# Patient Record
Sex: FEMALE | Race: BLACK OR AFRICAN AMERICAN | URBAN - METROPOLITAN AREA
[De-identification: names, ages, dates, MRNs, and addresses within clinical notes are randomized per-mention and may not be internally consistent; named-entity substitution may affect disease eponyms.]

---

## 2017-04-17 LAB
ABO + RH BLD: NORMAL
ABO + RH BLD: NORMAL
BLD GP AB SCN SERPL QL: NEGATIVE
C TRACH DNA SPEC QL PROBE+SIG AMP: NEGATIVE
CULTURE MICRO: NORMAL
HBV SURFACE AG SERPL QL IA: NORMAL
HCT VFR BLD AUTO: 37 %
HEMOGLOBIN: 13.2 G/DL (ref 11.7–15.7)
HIV 1+2 AB+HIV1 P24 AG SERPL QL IA: NORMAL
N GONORRHOEA DNA SPEC QL PROBE+SIG AMP: NEGATIVE
PLATELET # BLD AUTO: 206 10^9/L
T PALLIDUM IGG SER QL: NORMAL

## 2017-04-26 PROBLEM — M54.50 CHRONIC LOW BACK PAIN: Status: ACTIVE | Noted: 2017-04-26

## 2017-04-26 PROBLEM — L91.0 KELOID SCAR: Status: ACTIVE | Noted: 2017-04-26

## 2017-04-26 PROBLEM — G89.29 CHRONIC LOW BACK PAIN: Status: ACTIVE | Noted: 2017-04-26

## 2017-04-26 PROBLEM — Z34.90 SUPERVISION OF NORMAL PREGNANCY: Status: ACTIVE | Noted: 2017-04-26

## 2017-05-11 ENCOUNTER — PRENATAL OFFICE VISIT (OUTPATIENT)
Dept: OBGYN | Facility: CLINIC | Age: 27
End: 2017-05-11
Payer: COMMERCIAL

## 2017-05-11 VITALS
DIASTOLIC BLOOD PRESSURE: 64 MMHG | SYSTOLIC BLOOD PRESSURE: 97 MMHG | WEIGHT: 112 LBS | HEART RATE: 129 BPM | OXYGEN SATURATION: 96 %

## 2017-05-11 DIAGNOSIS — O34.219 PREVIOUS CESAREAN DELIVERY, ANTEPARTUM CONDITION OR COMPLICATION: Primary | ICD-10-CM

## 2017-05-11 PROBLEM — O26.891 RH NEGATIVE STATUS DURING PREGNANCY IN FIRST TRIMESTER, ANTEPARTUM: Status: ACTIVE | Noted: 2017-05-11

## 2017-05-11 PROBLEM — Z34.90 SUPERVISION OF NORMAL PREGNANCY: Status: RESOLVED | Noted: 2017-04-26 | Resolved: 2017-05-11

## 2017-05-11 PROBLEM — Z98.891 PREVIOUS CESAREAN SECTION: Status: ACTIVE | Noted: 2017-05-11

## 2017-05-11 PROBLEM — Z67.91 RH NEGATIVE STATUS DURING PREGNANCY IN FIRST TRIMESTER, ANTEPARTUM: Status: ACTIVE | Noted: 2017-05-11

## 2017-05-11 PROCEDURE — G0145 SCR C/V CYTO,THINLAYER,RESCR: HCPCS | Performed by: OBSTETRICS & GYNECOLOGY

## 2017-05-11 PROCEDURE — 87591 N.GONORRHOEAE DNA AMP PROB: CPT | Performed by: OBSTETRICS & GYNECOLOGY

## 2017-05-11 PROCEDURE — 99207 ZZC FIRST OB VISIT: CPT | Performed by: OBSTETRICS & GYNECOLOGY

## 2017-05-11 PROCEDURE — T1013 SIGN LANG/ORAL INTERPRETER: HCPCS | Mod: U3 | Performed by: OBSTETRICS & GYNECOLOGY

## 2017-05-11 PROCEDURE — 87491 CHLMYD TRACH DNA AMP PROBE: CPT | Performed by: OBSTETRICS & GYNECOLOGY

## 2017-05-11 NOTE — MR AVS SNAPSHOT
"              After Visit Summary   2017    Jean-Paul Jimenez    MRN: [id number]           Patient Information     Date Of Birth          1990        Visit Information        Provider Department      2017 9:45 AM Nevin Ospina; Colette Clement MD [facility]        Today's Diagnoses     Previous  delivery, antepartum condition or complication    -  1       Follow-ups after your visit        Your next 10 appointments already scheduled     2017 10:30 AM CDT   ESTABLISHED PRENATAL with Colette Clement MD   [facility] ([facility])    [address] [place] [id number] [phone]              Who to contact     If you have questions or need follow up information about today's clinic visit or your schedule please contact [facility] directly at [phone].  Normal or non-critical lab and imaging results will be communicated to you by MyChart, letter or phone within 4 business days after the clinic has received the results. If you do not hear from us within 7 days, please contact the clinic through MyChart or phone. If you have a critical or abnormal lab result, we will notify you by phone as soon as possible.  Submit refill requests through [employer] or call your pharmacy and they will forward the refill request to us. Please allow 3 business days for your refill to be completed.          Additional Information About Your Visit        MyChart Information     [employer] lets you send messages to your doctor, view your test results, renew your prescriptions, schedule appointments and more. To sign up, go to [email or web address] . Click on \"Log in\" on the left side of the screen, which will take you to the Welcome page. Then click on \"Sign up Now\" on the right side of the page.     You will be asked to enter the access code listed below, as well as some personal information. Please follow the directions to create " your username and password.     Your access code is: [email or web address]  Expires: 2017 11:54 AM     Your access code will  in 90 days. If you need help or a new code, please call your [facility] or [phone].        Care EveryWhere ID     This is your Care EveryWhere ID. This could be used by other organizations to access your [place] medical records  [id number]        Your Vitals Were     Pulse Last Period Pulse Oximetry Breastfeeding?          129 2017 96% No         Blood Pressure from Last 3 Encounters:   17 97/64    Weight from Last 3 Encounters:   17 112 lb (50.8 kg)              We Performed the Following     ABO and Rh     Anti Treponema     CBC with platelets     Chlamydia trachomatis PCR     CHLAMYDIA TRACHOMATIS PCR     Hepatitis B surface antigen     HIV Antigen Antibody Combo     NEISSERIA GONORRHOEA PCR     Neisseria gonorrhoeae PCR     OB hemoglobin     Pap imaged thin layer screen only - recommended age 21 - 24 years     Urine Culture Aerobic Bacterial        Primary Care Provider    None Specified       No primary provider on file.        Thank you!     Thank you for [place] [facility]  [place] your [place]. Our goal is always to provide you with excellent care. Hearing back from our patients is one way we can continue to improve our services. Please take a few minutes to complete the written survey that you may receive in the mail after your visit with us. Thank you!             Your Updated Medication List - Protect others around you: Learn how to safely use, store and throw away your medicines at www.disposemymeds.org.      Notice  As of 2017 11:54 AM    You have not been prescribed any medications.

## 2017-05-11 NOTE — NURSING NOTE
Chief Complaint   Patient presents with     Prenatal Care       Initial BP 97/64  Pulse 129  Wt 112 lb (50.8 kg)  LMP 2017  SpO2 96%  Breastfeeding? No There is no height or weight on file to calculate BMI.  BP completed using cuff size: regular        The following HM Due: pap smear      The following patient reported/Care Every where data was sent to:  P ABSTRACT QUALITY INITIATIVES [[id number]]  none    patient has appointment for today

## 2017-05-11 NOTE — LETTER
[address]  [facility] [address]  [phone]      May 15, 2017      Jean-Paul Jimenez  [address] [place] [address]              Dear Jean-Paul,    Your recent gonorrhea and chlamydia cultures were negative.  If you have any questions please call the nurse line at [phone].      Sincerely,      Colette Clement MD/[facility]dy

## 2017-05-11 NOTE — LETTER
May 17, 2017      Jean-Paul Jimenez  [address]  [place] [address]    Dear ,      I am happy to inform you that your recent cervical cancer screening test (PAP smear) was normal.      Preventative screenings such as this help to ensure your health for years to come. You should repeat a pap smear in 3 years, unless otherwise directed.      You will still need to return to the clinic every year for your annual exam and other preventive tests.     Please contact the clinic at [phone] if you have further questions.       Sincerely,      Colette Clement MD/[facility]

## 2017-05-11 NOTE — PROGRESS NOTES
SUBJECTIVE: Jean-Paul Jimenez is an 26 year old female  here for initial OB visit.   LMP was Patient's last menstrual period was 2017.  DEEDEE is Estimated Date of Delivery: 2017, and she is approximately 11w5d  weeks today.     She reports some nausea, otherwise no current significant problems.  Discussed medication, she will call if she needs this.  She had a first OB visit at [facility], labs show Rh negative, wishes to transfer care here and deliver at [place].  Had primary C/S for pre-eclampsia, her  is here and states that they did not want a C/S but were told that they must proceed, so they did.  Menses were regular, LMP normal.      * Records were reviewed after patient left.  Primary LTCS, one layer uterine closure, done at 38w4d for severe pre-eclampsia, HELLP, remote from delivery.  Labs showed plts 54,000/uric acid 8.1//.  Per chart notes, patient was reluctant to proceed with C/S as the procedure was so dangerous in [place] country [place]  Procedure was uncomplicated, post op hgb 5.6, had blood transfusion.  Will discuss further at next visit.       EXAM: Blood pressure 97/64, pulse 129, weight 112 lb (50.8 kg), last menstrual period 2017, SpO2 96 %, not currently breastfeeding.  General appearance revealed a healthy, well-nourished woman in no distress.  HEENT exam is unremarkable.  Thyroid is not enlarged.  Normal respiratory effort was noted.  Heart rate is regular without murmur.    Breasts are without dominant mass. Abdomen is soft, nontender without hernia or hepatosplenomegaly. There is no abnormal inguinal nor axillary adenopathy. Skin was normal.  Extremeties were without edema.  Neuro/psych exam revealed appropriate mood and affect.      PELVIC EXAM:  External genitalia appeared normal.  Urethral meatus, urethra, and bladder appeared normal.  Cervix and vagina appeared normal.  Pap was sent.  GC/CT sent.    Uterus was 11-12  week size, non-tender.  FHT's heard.    Adnexae:  no masses nor tenderness.  Rectal deferred.     ASSESSMENT:  Normal first OB exam.  Prior C/S.     PLAN:  1.  Follow up in 4 weeks  2.  Fetal survey ultrasound, quad screen offered.  3.  First trimester screening discussed and offered.  They declined genetic testing/screening.  Would like survey ultrasound.   4.  Will discuss prior records at next visit, discuss mode of delivery.

## 2017-05-12 LAB
C TRACH DNA SPEC QL NAA+PROBE: NORMAL
N GONORRHOEA DNA SPEC QL NAA+PROBE: NORMAL
SPECIMEN SOURCE: NORMAL
SPECIMEN SOURCE: NORMAL

## 2017-05-15 LAB
COPATH REPORT: NORMAL
PAP: NORMAL

## 2017-07-20 ENCOUNTER — PRENATAL OFFICE VISIT (OUTPATIENT)
Dept: OBGYN | Facility: CLINIC | Age: 27
End: 2017-07-20
Payer: COMMERCIAL

## 2017-07-20 ENCOUNTER — TELEPHONE (OUTPATIENT)
Dept: OBGYN | Facility: CLINIC | Age: 27
End: 2017-07-20

## 2017-07-20 VITALS
DIASTOLIC BLOOD PRESSURE: 54 MMHG | OXYGEN SATURATION: 100 % | WEIGHT: 119 LBS | SYSTOLIC BLOOD PRESSURE: 91 MMHG | HEART RATE: 59 BPM

## 2017-07-20 DIAGNOSIS — Z34.80 SUPERVISION OF OTHER NORMAL PREGNANCY, ANTEPARTUM: Primary | ICD-10-CM

## 2017-07-20 PROCEDURE — 99207 ZZC PRENATAL VISIT: CPT | Performed by: OBSTETRICS & GYNECOLOGY

## 2017-07-20 NOTE — TELEPHONE ENCOUNTER
Vishal Sharma  10/29/1963.  's phone number is [phone].  Note is done and is at the  for pick-up.  Pt's , Florinda Manning, is aware.  Patti Petersen RN

## 2017-07-20 NOTE — PROGRESS NOTES
Limited communication today as  was not scheduled.  Wants survey ultrasound, order written.  S>D.  Discussed one hour glucose.  She will RTC in 3 weeks to do one hour glucose, to review prior C/S records, with  present. They request letter documenting need for patient's mother to travel here from [place] to assist patient after delivery.   AM

## 2017-07-20 NOTE — MR AVS SNAPSHOT
"              After Visit Summary   7/20/2017    Jean-Paul Jimenez    MRN: [id number]           Patient Information     Date Of Birth          1990        Visit Information        Provider Department      7/20/2017 9:00 AM Open, Assignments; Colette Clement MD [facility]        Today's Diagnoses     Supervision of other normal pregnancy, antepartum    -  1       Follow-ups after your visit        Your next 10 appointments already scheduled     Aug 03, 2017 11:30 AM CDT   ESTABLISHED PRENATAL with Colette Clement MD   [facility] ([facility]    [address] [place] [id number] [phone]              Future tests that were ordered for you today     Open Future Orders        Priority Expected Expires Ordered    US OB > 14 Weeks Complete Single Routine  7/20/2018 7/20/2017            Who to contact     If you have questions or need follow up information about today's clinic visit or your schedule please contact [facility] directly at [phone].  Normal or non-critical lab and imaging results will be communicated to you by Sermohart, letter or phone within 4 business days after the clinic has received the results. If you do not hear from us within 7 days, please contact the clinic through Sermohart or phone. If you have a critical or abnormal lab result, we will notify you by phone as soon as possible.  Submit refill requests through [employer]Share or call your pharmacy and they will forward the refill request to us. Please allow 3 business days for your refill to be completed.          Additional Information About Your Visit        Sermohart Information     PaperShare lets you send messages to your doctor, view your test results, renew your prescriptions, schedule appointments and more. To sign up, go to [email or web address] . Click on \"Log in\" on the left side of the screen, which will take you to the Welcome page. Then click on \"Sign up " "Now\" on the right side of the page.     You will be asked to enter the access code listed below, as well as some personal information. Please follow the directions to create your username and password.     Your access code is: [email or web address]  Expires: 2017 11:54 AM     Your access code will  in 90 days. If you need help or a new code, please call your [place] clinic or [phone].        Care EveryWhere ID     This is your Care EveryWhere ID. This could be used by other organizations to access your [place] medical records  [id number]        Your Vitals Were     Pulse Last Period Pulse Oximetry Breastfeeding?          59 2017 100% No         Blood Pressure from Last 3 Encounters:   17 91/54   17 97/64    Weight from Last 3 Encounters:   17 119 lb (54 kg)   17 112 lb (50.8 kg)                 Today's Medication Changes          These changes are accurate as of: 17 10:22 AM.  If you have any questions, ask your nurse or doctor.               Start taking these medicines.        Dose/Directions    [facility] PRENATAL VITAMINS 28-0.8 MG Tabs   Used for:  Supervision of other normal pregnancy, antepartum   Started by:  Colette Clement MD        Dose:  1 tablet   Take 1 tablet by mouth daily   Quantity:  100 tablet   Refills:  3            Where to get your medicines      These medications were sent to [id number] - [place], [place] - [address], [place] [address]     Phone:  [phone]     [facility] PRENATAL VITAMINS 28-0.8 MG Tabs                Primary Care Provider    None Specified       No primary provider on file.        Equal Access to Services     [facility]: Hadii benny Abdi, lore luvinnyadaha, qaybta kaalmada nikita starks. [facility] [phone].    ATENCIÓN: Si habla español, tiene a stahl disposición servicios gratuitos de asistencia " lingüística. Brynn al [phone].    We comply with applicable federal civil rights laws and [place] laws. We do not discriminate on the basis of race, color, national origin, age, disability sex, sexual orientation or gender identity.            Thank you!     Thank you for choosing [facility]  for your care. Our goal is always to provide you with excellent care. Hearing back from our patients is one way we can continue to improve our services. Please take a few minutes to complete the written survey that you may receive in the mail after your visit with us. Thank you!             Your Updated Medication List - Protect others around you: Learn how to safely use, store and throw away your medicines at www.disposemymeds.org.          This list is accurate as of: 7/20/17 10:22 AM.  Always use your most recent med list.                   Brand Name Dispense Instructions for use Diagnosis    GNP PRENATAL VITAMINS 28-0.8 MG Tabs     100 tablet    Take 1 tablet by mouth daily    Supervision of other normal pregnancy, antepartum

## 2017-07-20 NOTE — TELEPHONE ENCOUNTER
TC to the pt via interpretor to find out what her mother's (Vishal Sharma)  is, in order to provide the pt with a letter to request that her mother come to the [place] to help take care of her and her baby. Once the letter is completed, let the  know and he will pick it up. Verify what telephone number we should call, no number in the chart for the . Letter requested by Dr. Clement. TC. Luz Elena Leonrado RN

## 2017-07-20 NOTE — LETTER
To Whom It May Concern,     Jean-Paul Jimenez is a patient under my care during her pregnancy.  He due date is 2017.  She is requesting that her mother be allowed to come to this country to help her with her new baby.  Her name is Vishal Stevevega,  10/29/1963.  If you have any questions, don't hesitate to contact me at my office.        Sincerely,          Dr. Colette Clement

## 2017-08-03 ENCOUNTER — PRE VISIT (OUTPATIENT)
Dept: MATERNAL FETAL MEDICINE | Facility: CLINIC | Age: 27
End: 2017-08-03

## 2017-08-03 ENCOUNTER — PRENATAL OFFICE VISIT (OUTPATIENT)
Dept: OBGYN | Facility: CLINIC | Age: 27
End: 2017-08-03
Payer: COMMERCIAL

## 2017-08-03 VITALS
OXYGEN SATURATION: 100 % | HEART RATE: 93 BPM | WEIGHT: 123 LBS | SYSTOLIC BLOOD PRESSURE: 96 MMHG | DIASTOLIC BLOOD PRESSURE: 58 MMHG

## 2017-08-03 DIAGNOSIS — O34.219 PREVIOUS CESAREAN DELIVERY, ANTEPARTUM CONDITION OR COMPLICATION: Primary | ICD-10-CM

## 2017-08-03 LAB
ALBUMIN SERPL-MCNC: 2.8 G/DL (ref 3.4–5)
ALP SERPL-CCNC: 34 U/L (ref 40–150)
ALT SERPL W P-5'-P-CCNC: 12 U/L (ref 0–50)
AST SERPL W P-5'-P-CCNC: 13 U/L (ref 0–45)
BILIRUB DIRECT SERPL-MCNC: 0.1 MG/DL (ref 0–0.2)
BILIRUB SERPL-MCNC: 0.6 MG/DL (ref 0.2–1.3)
CREAT SERPL-MCNC: 0.56 MG/DL (ref 0.52–1.04)
CREAT UR-MCNC: 79 MG/DL
ERYTHROCYTE [DISTWIDTH] IN BLOOD BY AUTOMATED COUNT: 14.1 % (ref 10–15)
GFR SERPL CREATININE-BSD FRML MDRD: NORMAL ML/MIN/1.7M2
HCT VFR BLD AUTO: 32.3 % (ref 35–47)
HGB BLD-MCNC: 10.9 G/DL (ref 11.7–15.7)
MCH RBC QN AUTO: 32.8 PG (ref 26.5–33)
MCHC RBC AUTO-ENTMCNC: 33.7 G/DL (ref 31.5–36.5)
MCV RBC AUTO: 97 FL (ref 78–100)
PLATELET # BLD AUTO: 135 10E9/L (ref 150–450)
PROT SERPL-MCNC: 6.6 G/DL (ref 6.8–8.8)
PROT UR-MCNC: 0.12 G/L
PROT/CREAT 24H UR: 0.15 G/G CR (ref 0–0.2)
RBC # BLD AUTO: 3.32 10E12/L (ref 3.8–5.2)
WBC # BLD AUTO: 8.4 10E9/L (ref 4–11)

## 2017-08-03 PROCEDURE — 85027 COMPLETE CBC AUTOMATED: CPT | Performed by: OBSTETRICS & GYNECOLOGY

## 2017-08-03 PROCEDURE — 82565 ASSAY OF CREATININE: CPT | Performed by: OBSTETRICS & GYNECOLOGY

## 2017-08-03 PROCEDURE — 84156 ASSAY OF PROTEIN URINE: CPT | Performed by: OBSTETRICS & GYNECOLOGY

## 2017-08-03 PROCEDURE — 99207 ZZC PRENATAL VISIT: CPT | Performed by: OBSTETRICS & GYNECOLOGY

## 2017-08-03 PROCEDURE — T1013 SIGN LANG/ORAL INTERPRETER: HCPCS | Mod: U3 | Performed by: OBSTETRICS & GYNECOLOGY

## 2017-08-03 PROCEDURE — 36415 COLL VENOUS BLD VENIPUNCTURE: CPT | Performed by: OBSTETRICS & GYNECOLOGY

## 2017-08-03 PROCEDURE — 80076 HEPATIC FUNCTION PANEL: CPT | Performed by: OBSTETRICS & GYNECOLOGY

## 2017-08-03 NOTE — MR AVS SNAPSHOT
After Visit Summary   8/3/2017    Jean-Paul Jimenez    MRN: [id number]           Patient Information     Date Of Birth          1990        Visit Information        Provider Department      8/3/2017 11:15 AM Nevin Ospina; Colette Clement MD [facility]        Today's Diagnoses     Previous  delivery, antepartum condition or complication    -  1       Follow-ups after your visit        Additional Services     MAT FETAL MED CTR REFERRAL-PREGNANCY       >> Patient may proceed with recommendations for further testing as directed by the Maternal Fetal Medicine Specialist >>    >> If requesting Fetal Echo: MFM will determine appropriate location for exam due to indication.    >> If requesting Lung Maturity Amnio:  If results indicate fetal lung maturity, induction or C/S is recommended within 36 hours.  Please schedule accordingly.     Dear Patient:   Please be aware that coverage of these services is subject to the terms and limitations of your health insurance plan.  Call member services at your health plan with any benefit or coverage questions.      Please bring the following to your appointment:    >>  Any x-rays, CTs or MRIs which have been performed.  Contact the facility where they were done to arrange for  prior to your scheduled appointment.  Any new CT, MRI or other procedures ordered by your specialist must be performed at a [facility] facility or coordinated by your clinic's referral office.  >>  List of current medications   >>  This referral request   >>  Any documents/labs given to you for this referral                  Your next 10 appointments already scheduled     Aug 08, 2017  9:30 AM CDT   MFM US COMP with [id number]   [facility] [place] ([facility])    [address]  [facility] [id number] [phone]           Wear comfortable clothes and leave your valuables at home.             Aug 08, 2017 10:00 AM CDT   Radiology MD with UR MONALISA BARRY   [facility] Maternal Fetal Medicine [facility] ([facility])    [address] [place] [address]   [phone]           Please arrive at the time given for your first appointment. This visit is used internally to schedule the physician's time during your ultrasound.            Aug 08, 2017 10:30 AM CDT   LAB with RD [facility]   [facility]    [address] [place] [id number] [phone]           Patient must bring picture ID. Patient should be prepared to give a urine specimen  Please do not eat 10-12 hours before your appointment if you are coming in fasting for labs on lipids, cholesterol, or glucose (sugar). Pregnant women should follow their Care Team instructions. Water with medications is okay. Do not drink coffee or other fluids. If you have concerns about taking  your medications, please ask at office or if scheduling via [employer], send a message by clicking on Secure Messaging, Message Your Care Team.              Future tests that were ordered for you today     Open Future Orders        Priority Expected Expires Ordered    [facility] Comprehensive Single Routine  6/3/2018 8/3/2017            Who to contact     If you have questions or need follow up information about today's clinic visit or your schedule please contact [facility] directly at [phone].  Normal or non-critical lab and imaging results will be communicated to you by MyChart, letter or phone within 4 business days after the clinic has received the results. If you do not hear from us within 7 days, please contact the clinic through MyChart or phone. If you have a critical or abnormal lab result, we will notify you by phone as soon as possible.  Submit refill requests through [employer] or call your pharmacy and they will forward the refill request to us. Please  "allow 3 business days for your refill to be completed.          Additional Information About Your Visit        MyChart Information     larkhart lets you send messages to your doctor, view your test results, renew your prescriptions, schedule appointments and more. To sign up, go to [email or web address] . Click on \"Log in\" on the left side of the screen, which will take you to the Welcome page. Then click on \"Sign up Now\" on the right side of the page.     You will be asked to enter the access code listed below, as well as some personal information. Please follow the directions to create your username and password.     Your access code is: [email or web address]  Expires: 2017 11:54 AM     Your access code will  in 90 days. If you need help or a new code, please call your [place] clinic or [phone].        Care EveryWhere ID     This is your Care EveryWhere ID. This could be used by other organizations to access your [place] medical records  [id number]        Your Vitals Were     Pulse Last Period Pulse Oximetry Breastfeeding?          93 2017 100% No         Blood Pressure from Last 3 Encounters:   17 96/58   17 91/54   17 97/64    Weight from Last 3 Encounters:   17 123 lb (55.8 kg)   17 119 lb (54 kg)   17 112 lb (50.8 kg)              We Performed the Following     CBC with platelets     Creatinine urine calculation only     Creatinine     Hepatic panel (Albumin, ALT, AST, Bili, Alk Phos, TP)     MAT FETAL MED CTR REFERRAL-PREGNANCY     Protein  random urine        Primary Care Provider    None Specified       No primary provider on file.        Equal Access to Services     [facility]: Hadluc Abdi, lore lomas, nikita turner. [facility] [phone].    ATENCIÓN: Si habla español, tiene a stahl disposición servicios gratuitos de asistencia lingüística. Llame al [phone].    We comply " with applicable federal civil rights laws and [place] laws. We do not discriminate on the basis of race, color, national origin, age, disability sex, sexual orientation or gender identity.            Thank you!     Thank you for choosing [facility]  for your care. Our goal is always to provide you with excellent care. Hearing back from our patients is one way we can continue to improve our services. Please take a few minutes to complete the written survey that you may receive in the mail after your visit with us. Thank you!             Your Updated Medication List - Protect others around you: Learn how to safely use, store and throw away your medicines at www.disposemymeds.org.          This list is accurate as of: 8/3/17  1:15 PM.  Always use your most recent med list.                   Brand Name Dispense Instructions for use Diagnosis    GNP PRENATAL VITAMINS 28-0.8 MG Tabs     100 tablet    Take 1 tablet by mouth daily    Supervision of other normal pregnancy, antepartum

## 2017-08-03 NOTE — PROGRESS NOTES
here today.  Did not schedule ultrasound as she thought it would be done in the office today during this appt.  Will schedule MFM scan in view of advanced gestational age.  Having intermittent right thigh/leg pain, intermittent right vulvar pain (had similar symptoms in first pregnancy.)  Reviewed prior indication for C/S, prior diagnosis of severe pre-eclampsia.  Labs as ordered.  She will do one hour glucose next week.  She is interested in TOLAC, will discuss options in detail at next visit with  present.   RTC 3-4 weeks.  AM

## 2017-08-03 NOTE — LETTER
[place] [id number] [place] [address] [address]  [facility] [address]  [phone]      August 3, 2017      Jean-Paul Jimenez/  [address]  [place] [address]              To Whom It May Concern:    Jean-Paul Jimenez is a patient under my care during her pregnancy.  Her due date is 2017.  She is requesting that her mother be allowed to come to this country to help her with her new baby.  Her name is Vishal Shamra,  is 1971.  If you have any questions, don't hesitate to contact me at my office, the number is shown as above.      Sincerely,            Dr. Colette Clement

## 2017-08-05 PROBLEM — D69.6 THROMBOCYTOPENIA (H): Status: ACTIVE | Noted: 2017-08-05

## 2017-08-08 ENCOUNTER — HOSPITAL ENCOUNTER (OUTPATIENT)
Dept: ULTRASOUND IMAGING | Facility: CLINIC | Age: 27
Discharge: HOME | End: 2017-08-08
Attending: OBSTETRICS & GYNECOLOGY | Admitting: OBSTETRICS & GYNECOLOGY
Payer: COMMERCIAL

## 2017-08-08 ENCOUNTER — OFFICE VISIT (OUTPATIENT)
Dept: MATERNAL FETAL MEDICINE | Facility: CLINIC | Age: 27
End: 2017-08-08
Attending: OBSTETRICS & GYNECOLOGY
Payer: COMMERCIAL

## 2017-08-08 DIAGNOSIS — O34.219 PREVIOUS CESAREAN DELIVERY, ANTEPARTUM CONDITION OR COMPLICATION: Primary | ICD-10-CM

## 2017-08-08 DIAGNOSIS — O26.891 RH NEGATIVE STATUS DURING PREGNANCY IN FIRST TRIMESTER, ANTEPARTUM: ICD-10-CM

## 2017-08-08 DIAGNOSIS — O09.292 HX OF PREECLAMPSIA, PRIOR PREGNANCY, CURRENTLY PREGNANT, SECOND TRIMESTER: Primary | ICD-10-CM

## 2017-08-08 DIAGNOSIS — O26.90 PREGNANCY RELATED CONDITION, UNSPECIFIED TRIMESTER: ICD-10-CM

## 2017-08-08 DIAGNOSIS — Z67.91 RH NEGATIVE STATUS DURING PREGNANCY IN FIRST TRIMESTER, ANTEPARTUM: ICD-10-CM

## 2017-08-08 LAB
BLD GP AB SCN SERPL QL: NORMAL
GLUCOSE 1H P 50 G GLC PO SERPL-MCNC: 124 MG/DL (ref 60–129)
HGB BLD-MCNC: 10.2 G/DL (ref 11.7–15.7)

## 2017-08-08 PROCEDURE — 86850 RBC ANTIBODY SCREEN: CPT | Performed by: OBSTETRICS & GYNECOLOGY

## 2017-08-08 PROCEDURE — 36415 COLL VENOUS BLD VENIPUNCTURE: CPT | Performed by: OBSTETRICS & GYNECOLOGY

## 2017-08-08 PROCEDURE — 82950 GLUCOSE TEST: CPT | Performed by: OBSTETRICS & GYNECOLOGY

## 2017-08-08 PROCEDURE — 00000218 ZZHCL STATISTIC OBHBG - HEMOGLOBIN: Performed by: OBSTETRICS & GYNECOLOGY

## 2017-08-08 PROCEDURE — 76811 OB US DETAILED SNGL FETUS: CPT

## 2017-08-08 NOTE — MR AVS SNAPSHOT
After Visit Summary   8/8/2017    Jean-Paul Jimenez    MRN: [id number]           Patient Information     Date Of Birth          1990        Visit Information        Provider Department      8/8/2017 10:00 AM Claus Baltazar MD [facility] Maternal Fetal Medicine [facility]        Today's Diagnoses     Hx of preeclampsia, prior pregnancy, currently pregnant, second trimester    -  1       Follow-ups after your visit        Your next 10 appointments already scheduled     Sep 06, 2017  9:30 AM CDT   MFM US COMPRE SINGLE F/U with [id number]   [facility] Maternal Fetal Medicine Ultrasound - [facility])    [address]  [facility] [id number] [phone]           Wear comfortable clothes and leave your valuables at home.            Sep 06, 2017 10:00 AM CDT   Radiology MD with UR MONALISA BARRY   [facility] Maternal Fetal Medicine - [facility])    [address]  [facility] [address]   [phone]           Please arrive at the time given for your first appointment. This visit is used internally to schedule the physician's time during your ultrasound.              Future tests that were ordered for you today     Open Future Orders        Priority Expected Expires Ordered    MFM US Comprehensive Single F/U Routine  8/8/2018 8/8/2017            Who to contact     If you have questions or need follow up information about today's clinic visit or your schedule please contact [facility] MATERNAL FETAL MEDICINE [facility] directly at [phone].  Normal or non-critical lab and imaging results will be communicated to you by MyChart, letter or phone within 4 business days after the clinic has received the results. If you do not hear from us within 7 days, please contact the clinic through MyChart or phone. If you have a critical or abnormal lab result, we will notify you by phone as soon as possible.  Submit refill  "requests through [employer] or call your pharmacy and they will forward the refill request to us. Please allow 3 business days for your refill to be completed.          Additional Information About Your Visit        Elo7hart Information     [employer] lets you send messages to your doctor, view your test results, renew your prescriptions, schedule appointments and more. To sign up, go to [email or web address] . Click on \"Log in\" on the left side of the screen, which will take you to the Welcome page. Then click on \"Sign up Now\" on the right side of the page.     You will be asked to enter the access code listed below, as well as some personal information. Please follow the directions to create your username and password.     Your access code is: [email or web address]  Expires: 2017 11:54 AM     Your access code will  in 90 days. If you need help or a new code, please call your [place] clinic or [phone].        Care EveryWhere ID     This is your Care EveryWhere ID. This could be used by other organizations to access your [place] medical records  [id number]        Your Vitals Were     Last Period                   2017            Blood Pressure from Last 3 Encounters:   17 96/58   17 91/54   17 97/64    Weight from Last 3 Encounters:   17 55.8 kg (123 lb)   17 54 kg (119 lb)   17 50.8 kg (112 lb)               Primary Care Provider    None Specified       No primary provider on file.        Equal Access to Services     [facility]: Hadii benny ku hadasho Sopaulinaali, waaxda luqadaha, qaybta kaalmada adeegyada, nikita vail. So [facility] [phone].    ATENCIÓN: Si habla español, tiene a stahl disposición servicios gratuitos de asistencia lingüística. Llame al [phone].    We comply with applicable federal civil rights laws and [place] laws. We do not discriminate on the basis of race, color, national origin, age, disability sex, sexual " orientation or gender identity.            Thank you!     Thank you for choosing [facility]  for your care. Our goal is always to provide you with excellent care. Hearing back from our patients is one way we can continue to improve our services. Please take a few minutes to complete the written survey that you may receive in the mail after your visit with us. Thank you!             Your Updated Medication List - Protect others around you: Learn how to safely use, store and throw away your medicines at www.disposemymeds.org.          This list is accurate as of: 8/8/17 10:42 AM.  Always use your most recent med list.                   Brand Name Dispense Instructions for use Diagnosis    GNP PRENATAL VITAMINS 28-0.8 MG Tabs     100 tablet    Take 1 tablet by mouth daily    Supervision of other normal pregnancy, antepartum

## 2017-08-08 NOTE — PROGRESS NOTES
"Please see \"Imaging\" tab under \"Chart Review\" for details of today's US at the [facility].    Claus Baltazar MD  Maternal-Fetal Medicine      "

## 2017-08-30 ENCOUNTER — PRENATAL OFFICE VISIT (OUTPATIENT)
Dept: OBGYN | Facility: CLINIC | Age: 27
End: 2017-08-30
Payer: COMMERCIAL

## 2017-08-30 VITALS — WEIGHT: 127 LBS | SYSTOLIC BLOOD PRESSURE: 94 MMHG | DIASTOLIC BLOOD PRESSURE: 62 MMHG

## 2017-08-30 DIAGNOSIS — Z67.91 RH NEGATIVE STATUS DURING PREGNANCY IN FIRST TRIMESTER, ANTEPARTUM: ICD-10-CM

## 2017-08-30 DIAGNOSIS — O26.891 RH NEGATIVE STATUS DURING PREGNANCY IN FIRST TRIMESTER, ANTEPARTUM: ICD-10-CM

## 2017-08-30 DIAGNOSIS — O34.219 PREVIOUS CESAREAN DELIVERY, ANTEPARTUM CONDITION OR COMPLICATION: Primary | ICD-10-CM

## 2017-08-30 DIAGNOSIS — Z23 NEED FOR TDAP VACCINATION: ICD-10-CM

## 2017-08-30 LAB
ERYTHROCYTE [DISTWIDTH] IN BLOOD BY AUTOMATED COUNT: 13.7 % (ref 10–15)
HCT VFR BLD AUTO: 31.8 % (ref 35–47)
HGB BLD-MCNC: 10.8 G/DL (ref 11.7–15.7)
MCH RBC QN AUTO: 33 PG (ref 26.5–33)
MCHC RBC AUTO-ENTMCNC: 34 G/DL (ref 31.5–36.5)
MCV RBC AUTO: 97 FL (ref 78–100)
PLATELET # BLD AUTO: 135 10E9/L (ref 150–450)
RBC # BLD AUTO: 3.27 10E12/L (ref 3.8–5.2)
WBC # BLD AUTO: 8.8 10E9/L (ref 4–11)

## 2017-08-30 PROCEDURE — 36415 COLL VENOUS BLD VENIPUNCTURE: CPT | Performed by: OBSTETRICS & GYNECOLOGY

## 2017-08-30 PROCEDURE — 90471 IMMUNIZATION ADMIN: CPT | Performed by: OBSTETRICS & GYNECOLOGY

## 2017-08-30 PROCEDURE — 99207 ZZC PRENATAL VISIT: CPT | Performed by: OBSTETRICS & GYNECOLOGY

## 2017-08-30 PROCEDURE — 96372 THER/PROPH/DIAG INJ SC/IM: CPT | Performed by: OBSTETRICS & GYNECOLOGY

## 2017-08-30 PROCEDURE — 90715 TDAP VACCINE 7 YRS/> IM: CPT | Performed by: OBSTETRICS & GYNECOLOGY

## 2017-08-30 PROCEDURE — 85027 COMPLETE CBC AUTOMATED: CPT | Performed by: OBSTETRICS & GYNECOLOGY

## 2017-08-30 NOTE — MR AVS SNAPSHOT
After Visit Summary   2017    Jean-Paul Jimenez    MRN: [id number]           Patient Information     Date Of Birth          1990        Visit Information        Provider Department      2017 2:15 PM Colette Clement MD; LANGUAGE [facility]        Today's Diagnoses     Previous  delivery, antepartum condition or complication    -  1    Need for Tdap vaccination        Rh negative status during pregnancy in first trimester, antepartum           Follow-ups after your visit        Your next 10 appointments already scheduled     Sep 06, 2017  9:30 AM CDT   MFM US COMPRE SINGLE F/U with [id number]   [facility] Maternal Fetal Medicine Ultrasound - [facility])    [address]  [facility] [address] [phone]           Wear comfortable clothes and leave your valuables at home.            Sep 06, 2017 10:00 AM CDT   Radiology MD with UR MONALISA BARRY   [facility] Maternal [facility]etal [facility] - [facility])    [address]  [facility] [id number] [phone]           Please arrive at the time given for your first appointment. This visit is used internally to schedule the physician's time during your ultrasound.            Sep 12, 2017 10:45 AM CDT   ESTABLISHED PRENATAL with Colette Clement MD   [facility] ([facility])    [address] [place] [address] [phone]              Who to contact     If you have questions or need follow up information about today's clinic visit or your schedule please contact [facility] directly at [phone].  Normal or non-critical lab and imaging results will be communicated to you by MyChart, letter or phone within 4 business days after the clinic has received the results. If you do not hear from us within 7 days, please contact the clinic through MyChart or phone. If  "you have a critical or abnormal lab result, we will notify you by phone as soon as possible.  Submit refill requests through OPH[employer] or call your pharmacy and they will forward the refill request to us. Please allow 3 business days for your refill to be completed.          Additional Information About Your Visit        Biomode - Biomolecular Determinationhart Information     OPH[employer] lets you send messages to your doctor, view your test results, renew your prescriptions, schedule appointments and more. To sign up, go to [email or web address] . Click on \"Log in\" on the left side of the screen, which will take you to the Welcome page. Then click on \"Sign up Now\" on the right side of the page.     You will be asked to enter the access code listed below, as well as some personal information. Please follow the directions to create your username and password.     Your access code is: [email or web address]  Expires: 2017  8:01 PM     Your access code will  in 90 days. If you need help or a new code, please call your [place] clinic or [phone].        Care EveryWhere ID     This is your Care EveryWhere ID. This could be used by other organizations to access your [place] medical records  [id number]        Your Vitals Were     Last Period                   2017            Blood Pressure from Last 3 Encounters:   17 94/62   17 96/58   17 91/54    Weight from Last 3 Encounters:   17 127 lb (57.6 kg)   17 123 lb (55.8 kg)   17 119 lb (54 kg)              We Performed the Following     CBC with platelets     INJECTION INTRAMUSCULAR OR SUB-Q     INJECTION INTRAMUSCULAR OR SUB-Q     RH IG, FULL-DOSE, IM     TDAP VACCINE (ADACEL)        Primary Care Provider    None Specified       No primary provider on file.        Equal Access to Services     [facility]YOS : Marci Abdi, lore lomas, nikita turner. [facility] [phone].    ATENCIÓN: " Si habla joan, tiene a stahl disposición servicios gratuitos de asistencia lingüística. Brynn baig [phone].    We comply with applicable federal civil rights laws and [place] laws. We do not discriminate on the basis of race, color, national origin, age, disability sex, sexual orientation or gender identity.            Thank you!     Thank you for choosing [facility]  for your care. Our goal is always to provide you with excellent care. Hearing back from our patients is one way we can continue to improve our services. Please take a few minutes to complete the written survey that you may receive in the mail after your visit with us. Thank you!             Your Updated Medication List - Protect others around you: Learn how to safely use, store and throw away your medicines at www.disposemymeds.org.          This list is accurate as of: 8/30/17  8:01 PM.  Always use your most recent med list.                   Brand Name Dispense Instructions for use Diagnosis    GNP PRENATAL VITAMINS 28-0.8 MG Tabs     100 tablet    Take 1 tablet by mouth daily    Supervision of other normal pregnancy, antepartum

## 2017-08-30 NOTE — LETTER
August 31, 2017      Jean-Paul Jimenez  [address]  [place] [address]        Dear ,    We are writing to inform you of your test results.    Labs are stable.    Resulted Orders   CBC with platelets   Result Value Ref Range    WBC 8.8 4.0 - 11.0 10e9/L    RBC Count 3.27 (L) 3.8 - 5.2 10e12/L    Hemoglobin 10.8 (L) 11.7 - 15.7 g/dL    Hematocrit 31.8 (L) 35.0 - 47.0 %    MCV 97 78 - 100 fl    MCH 33.0 26.5 - 33.0 pg    MCHC 34.0 31.5 - 36.5 g/dL    RDW 13.7 10.0 - 15.0 %    Platelet Count 135 (L) 150 - 450 10e9/L       If you have any questions or concerns, please call the clinic at the number listed above.       Sincerely,        Colette Clement MD

## 2017-08-31 NOTE — PROGRESS NOTES
MFM ultrasound was discordant with dates, repeat is planned to assess fetal growth.  She had originally stated a known LMP, now reports that LMP was unknown.  Some confusion about aspirin, she will start baby ASA now.  Plts were 135,000 8/3/17, will repeat today.  Tdap and Rhogam today.   here.  Will await MFM ultrasound results to discuss TOLAC vs RCS.  RTC 2 weeks.  AM

## 2017-09-06 ENCOUNTER — OFFICE VISIT (OUTPATIENT)
Dept: MATERNAL FETAL MEDICINE | Facility: CLINIC | Age: 27
End: 2017-09-06
Attending: OBSTETRICS & GYNECOLOGY
Payer: COMMERCIAL

## 2017-09-06 ENCOUNTER — HOSPITAL ENCOUNTER (OUTPATIENT)
Dept: ULTRASOUND IMAGING | Facility: CLINIC | Age: 27
Discharge: HOME | End: 2017-09-06
Attending: OBSTETRICS & GYNECOLOGY | Admitting: OBSTETRICS & GYNECOLOGY
Payer: COMMERCIAL

## 2017-09-06 DIAGNOSIS — O09.292 HX OF PREECLAMPSIA, PRIOR PREGNANCY, CURRENTLY PREGNANT, SECOND TRIMESTER: ICD-10-CM

## 2017-09-06 DIAGNOSIS — Z04.9 SUSPECTED CONDITION NOT FOUND: ICD-10-CM

## 2017-09-06 DIAGNOSIS — O09.292 HX OF PREECLAMPSIA, PRIOR PREGNANCY, CURRENTLY PREGNANT, SECOND TRIMESTER: Primary | ICD-10-CM

## 2017-09-06 PROCEDURE — 76816 OB US FOLLOW-UP PER FETUS: CPT

## 2017-09-06 NOTE — MR AVS SNAPSHOT
"              After Visit Summary   9/6/2017    Jean-Paul Jimenez    MRN: [id number]           Patient Information     Date Of Birth          1990        Visit Information        Provider Department      9/6/2017 10:00 AM Pat Moses,  [facility] Maternal Fetal Medicine [facility]        Today's Diagnoses     Hx of preeclampsia, prior pregnancy, currently pregnant, second trimester    -  1    Suspected condition not found           Follow-ups after your visit        Your next 10 appointments already scheduled     Sep 12, 2017 10:45 AM CDT   ESTABLISHED PRENATAL with Colette Clement MD   [facility] ([facility])    [address] [place] [address] [phone]              Who to contact     If you have questions or need follow up information about today's clinic visit or your schedule please contact [facility] MATERNAL FETAL MEDICINE [facility] directly at [phone].  Normal or non-critical lab and imaging results will be communicated to you by MyChart, letter or phone within 4 business days after the clinic has received the results. If you do not hear from us within 7 days, please contact the clinic through Quantum Grouphart or phone. If you have a critical or abnormal lab result, we will notify you by phone as soon as possible.  Submit refill requests through Payward or call your pharmacy and they will forward the refill request to us. Please allow 3 business days for your refill to be completed.          Additional Information About Your Visit        MyChart Information     Payward lets you send messages to your doctor, view your test results, renew your prescriptions, schedule appointments and more. To sign up, go to [email or web address] . Click on \"Log in\" on the left side of the screen, which will take you to the Welcome page. Then click on \"Sign up Now\" on the right side of the page.     You will be asked to enter the access code listed below, as well " as some personal information. Please follow the directions to create your username and password.     Your access code is: [email or web address]  Expires: 2017  8:01 PM     Your access code will  in 90 days. If you need help or a new code, please call your [place] clinic or [phone].        Care EveryWhere ID     This is your Care EveryWhere ID. This could be used by other organizations to access your [place] medical records  [id number]        Your Vitals Were     Last Period                   2017            Blood Pressure from Last 3 Encounters:   17 94/62   17 96/58   17 91/54    Weight from Last 3 Encounters:   17 57.6 kg (127 lb)   17 55.8 kg (123 lb)   17 54 kg (119 lb)              Today, you had the following     No orders found for display       Primary Care Provider    None Specified       No primary provider on file.        Equal Access to Services     [facility]: Hadii benny weber hadasho Sodarell, waaxda luqadaha, qaybta kaalmada adeegyada, nikita barrientos . So [facility] [phone].    ATENCIÓN: Si habla español, tiene a stahl disposición servicios gratuitos de asistencia lingüística. Llame al [phone].    We comply with applicable federal civil rights laws and [place] laws. We do not discriminate on the basis of race, color, national origin, age, disability sex, sexual orientation or gender identity.            Thank you!     Thank you for choosing [facility]  for your care. Our goal is always to provide you with excellent care. Hearing back from our patients is one way we can continue to improve our services. Please take a few minutes to complete the written survey that you may receive in the mail after your visit with us. Thank you!             Your Updated Medication List - Protect others around you: Learn how to safely use, store and throw away your medicines at www.disposemymeds.org.           This list is accurate as of: 9/6/17 10:38 AM.  Always use your most recent med list.                   Brand Name Dispense Instructions for use Diagnosis    GNP PRENATAL VITAMINS 28-0.8 MG Tabs     100 tablet    Take 1 tablet by mouth daily    Supervision of other normal pregnancy, antepartum

## 2017-09-06 NOTE — PROGRESS NOTES
"Please see \"Imaging\" tab under \"Chart Review\" for details of today's US.      Pat Moses, DO  Maternal-Fetal Medicine        "

## 2017-09-12 ENCOUNTER — PRENATAL OFFICE VISIT (OUTPATIENT)
Dept: OBGYN | Facility: CLINIC | Age: 27
End: 2017-09-12
Payer: COMMERCIAL

## 2017-09-12 VITALS
DIASTOLIC BLOOD PRESSURE: 63 MMHG | WEIGHT: 131 LBS | HEART RATE: 82 BPM | OXYGEN SATURATION: 96 % | SYSTOLIC BLOOD PRESSURE: 102 MMHG

## 2017-09-12 DIAGNOSIS — O34.219 PREVIOUS CESAREAN DELIVERY, ANTEPARTUM CONDITION OR COMPLICATION: Primary | ICD-10-CM

## 2017-09-12 PROCEDURE — 99207 ZZC PRENATAL VISIT: CPT | Performed by: OBSTETRICS & GYNECOLOGY

## 2017-09-12 NOTE — PROGRESS NOTES
was not available,  refuses a phone  and patient agrees.  He states that her LMP is unknown.  Repeat MFM ultrasound showed adequate growth, still consistent with DEEDEE 11/9/17.  She will RTC in one week, with , and we will discuss dating/TOLAC/RCS.  She has had Rhogam and Tdap.  AM

## 2017-09-12 NOTE — MR AVS SNAPSHOT
"              After Visit Summary   2017    Jean-Paul Jimenez    MRN: [id number]           Patient Information     Date Of Birth          1990        Visit Information        Provider Department      2017 10:30 AM Open, Supriya; Colette Clement MD [facility]        Today's Diagnoses     Previous  delivery, antepartum condition or complication    -  1       Follow-ups after your visit        Your next 10 appointments already scheduled     Sep 18, 2017  9:45 AM CDT   ESTABLISHED PRENATAL with Colette Clement MD   [facility] ([facility])    [address] [place] [address] [phone]            Sep 25, 2017 10:45 AM CDT   ESTABLISHED PRENATAL with Colette Clement MD   [facility] ([address] [address] [phone]              Who to contact     If you have questions or need follow up information about today's clinic visit or your schedule please contact [facility] directly at [phone].  Normal or non-critical lab and imaging results will be communicated to you by BluelightApphart, letter or phone within 4 business days after the clinic has received the results. If you do not hear from us within 7 days, please contact the clinic through NewVoiceMediat or phone. If you have a critical or abnormal lab result, we will notify you by phone as soon as possible.  Submit refill requests through MobiMagic or call your pharmacy and they will forward the refill request to us. Please allow 3 business days for your refill to be completed.          Additional Information About Your Visit        MyChart Information     MobiMagic lets you send messages to your doctor, view your test results, renew your prescriptions, schedule appointments and more. To sign up, go to [email or web address] . Click on \"Log in\" on the left side of the screen, which will take " "you to the Welcome page. Then click on \"Sign up Now\" on the right side of the page.     You will be asked to enter the access code listed below, as well as some personal information. Please follow the directions to create your username and password.     Your access code is: [email or web address]  Expires: 2017  8:01 PM     Your access code will  in 90 days. If you need help or a new code, please call your [place] clinic or [phone].        Care EveryWhere ID     This is your Care EveryWhere ID. This could be used by other organizations to access your [place] medical records  [id number]        Your Vitals Were     Pulse Last Period Pulse Oximetry Breastfeeding?          82 2017 96% No         Blood Pressure from Last 3 Encounters:   17 102/63   17 94/62   17 96/58    Weight from Last 3 Encounters:   17 131 lb (59.4 kg)   17 127 lb (57.6 kg)   17 123 lb (55.8 kg)              Today, you had the following     No orders found for display       Primary Care Provider    None Specified       No primary provider on file.        Equal Access to Services     KENDALL HOYOS : Marci Abdi, lore lomas, chacho kamary carmenda tereza, nikita vail. So [facility] [phone].    ATENCIÓN: Si habla español, tiene a stahl disposición servicios gratuitos de asistencia lingüística. Llame al [phone].    We comply with applicable federal civil rights laws and [place] laws. We do not discriminate on the basis of race, color, national origin, age, disability sex, sexual orientation or gender identity.            Thank you!     Thank you for choosing [facility]  for your care. Our goal is always to provide you with excellent care. Hearing back from our patients is one way we can continue to improve our services. Please take a few minutes to complete the written survey that you may receive in the mail after your visit with us. " Thank you!             Your Updated Medication List - Protect others around you: Learn how to safely use, store and throw away your medicines at www.disposemymeds.org.          This list is accurate as of: 9/12/17 11:37 AM.  Always use your most recent med list.                   Brand Name Dispense Instructions for use Diagnosis    GNP PRENATAL VITAMINS 28-0.8 MG Tabs     100 tablet    Take 1 tablet by mouth daily    Supervision of other normal pregnancy, antepartum

## 2017-09-12 NOTE — PROGRESS NOTES
Discussed with Dr. Baltazar who reviewed chart.  He advises 11/9/17 as most reliable DEEDEE, with scheduled RCS at 41 weeks (11/16/17) if she chooses TOLAC and does not labor prior.  Will discuss with patient at her next visit.

## 2017-09-18 ENCOUNTER — PRENATAL OFFICE VISIT (OUTPATIENT)
Dept: OBGYN | Facility: CLINIC | Age: 27
End: 2017-09-18
Payer: COMMERCIAL

## 2017-09-18 VITALS
WEIGHT: 131 LBS | DIASTOLIC BLOOD PRESSURE: 61 MMHG | SYSTOLIC BLOOD PRESSURE: 97 MMHG | HEART RATE: 98 BPM | OXYGEN SATURATION: 95 %

## 2017-09-18 DIAGNOSIS — O34.219 PREVIOUS CESAREAN DELIVERY, ANTEPARTUM CONDITION OR COMPLICATION: Primary | ICD-10-CM

## 2017-09-18 PROCEDURE — 99207 ZZC PRENATAL VISIT: CPT | Performed by: OBSTETRICS & GYNECOLOGY

## 2017-09-18 NOTE — Clinical Note
Surgeon: Colette Clement Assistant: resident OK  Procedure: RCS Diagnosis: prior c/S Length of surgery: one hour Morning admit: Yes Day/Time Preference: Nov 16 41 weeks Anesthesia: spinal Pre-op: Here Latex Allergy: No Want pre op labs done: Yes CBC type and screen  Please schedule, she wants TOLAC but agrees to 41 week RCS if undelivered.  You don't need to call her yet, I will talk with her at future visits.

## 2017-09-18 NOTE — PROGRESS NOTES
here.  Discussed dating with patient and , RCS at 41 weeks () if undelivered.  They agree.  Discussed TOLAC, risks/benefits.  Predicted success 69.7%.  She wishes TOLAC and signed consent today.  Probably hasn't been taking baby ASA regularly, states she will now.  RTC 1 week.  AM    TOLAC Candidate Assessment    Obstetric History       T1      L1     SAB0   TAB0   Ectopic0   Multiple0   Live Births1       # Outcome Date GA Lbr Maurizio/2nd Weight Sex Delivery Anes PTL Lv   2 Current            1 Term         HEYDI          Prior c/s date      Severe pre-eclampsia, HELLP, remote from delivery     Labor Course:          OP note reviewed and in EPIC:  Reviewed in care everywhere  Uterine closure:  single layer    Future Pregnancy Plans:undecided       calculator:  69.7%           PLAN:  The options of  and RCS were discussed in detail with her.  We reviewed the risks and benefits of both.  We discussed the risks of repeat .  Operative risks and benefits were discussed with the patient and questions were answered.  These risks include but are not limited to anesthesia, injury to internal organs, bleeding, infection, and need for further surgery.   We discussed the risks of , and in particular discussed the risk of uterine rupture and incidence.  We discussed the fact that this can be a catastrophic event for both the baby and the mother, requiring an immediate emergency RCS probably under general anesthesia, with associated increased operative risks.  We discussed the possible fetal damage or death, and the possible grave maternal risks, which can include hysterectomy.  We discussed the fact that we have in-house anesthesia, NICU, and OB personnel who can respond to these emergencies immediatedly at [facility], but also discussed the fact that even under these conditions a good outcome for mother and baby cannot be guaranteed.  Her questions were answered in  detail.     She wishes to proceed with a trial of labor and hopes for .  She is well-informed regarding her choices.

## 2017-09-18 NOTE — MR AVS SNAPSHOT
"              After Visit Summary   2017    Jean-Paul Jimenez    MRN: [id number]           Patient Information     Date Of Birth          1990        Visit Information        Provider Department      2017 9:30 AM Colette Clement MD; MULTILINGUAL WORD [facility]        Today's Diagnoses     Previous  delivery, antepartum condition or complication    -  1       Follow-ups after your visit        Your next 10 appointments already scheduled     Sep 25, 2017 10:45 AM CDT   ESTABLISHED PRENATAL with Colette Clement MD   [facility] ([facility])    [address] [place] [address] [phone]              Who to contact     If you have questions or need follow up information about today's clinic visit or your schedule please contact [facility] directly at [phone].  Normal or non-critical lab and imaging results will be communicated to you by MyChart, letter or phone within 4 business days after the clinic has received the results. If you do not hear from us within 7 days, please contact the clinic through MyChart or phone. If you have a critical or abnormal lab result, we will notify you by phone as soon as possible.  Submit refill requests through Str[employer] or call your pharmacy and they will forward the refill request to us. Please allow 3 business days for your refill to be completed.          Additional Information About Your Visit        MyChart Information     Stratio lets you send messages to your doctor, view your test results, renew your prescriptions, schedule appointments and more. To sign up, go to [email or web address] . Click on \"Log in\" on the left side of the screen, which will take you to the Welcome page. Then click on \"Sign up Now\" on the right side of the page.     You will be asked to enter the access code listed below, as well as some personal information. Please follow the directions to " create your username and password.     Your access code is: [email or web address]  Expires: 2017  8:01 PM     Your access code will  in 90 days. If you need help or a new code, please call your [place] clinic or [phone].        Care EveryWhere ID     This is your Care EveryWhere ID. This could be used by other organizations to access your [place] medical records  [id number]        Your Vitals Were     Pulse Last Period Pulse Oximetry Breastfeeding?          98 2017 95% No         Blood Pressure from Last 3 Encounters:   17 97/61   17 102/63   17 94/62    Weight from Last 3 Encounters:   17 131 lb (59.4 kg)   17 131 lb (59.4 kg)   17 127 lb (57.6 kg)              Today, you had the following     No orders found for display       Primary Care Provider    None Specified       No primary provider on file.        Equal Access to Services     [facility] : Hadii benny ku hadasho Soomaali, waaxda luqadaha, qaybta kaalmada adeegyada, waxay yulia barrientos . So [facility] [phone].    ATENCIÓN: Si habla español, tiene a stahl disposición servicios gratuitos de asistencia lingüística. Llame al [phone].    We comply with applicable federal civil rights laws and [place] laws. We do not discriminate on the basis of race, color, national origin, age, disability sex, sexual orientation or gender identity.            Thank you!     Thank you for choosing [facility]  for your care. Our goal is always to provide you with excellent care. Hearing back from our patients is one way we can continue to improve our services. Please take a few minutes to complete the written survey that you may receive in the mail after your visit with us. Thank you!             Your Updated Medication List - Protect others around you: Learn how to safely use, store and throw away your medicines at www.disposemymeds.org.          This list is accurate as of: 17  12:53 PM.  Always use your most recent med list.                   Brand Name Dispense Instructions for use Diagnosis    GNP PRENATAL VITAMINS 28-0.8 MG Tabs     100 tablet    Take 1 tablet by mouth daily    Supervision of other normal pregnancy, antepartum

## 2017-09-25 ENCOUNTER — PRENATAL OFFICE VISIT (OUTPATIENT)
Dept: OBGYN | Facility: CLINIC | Age: 27
End: 2017-09-25
Payer: COMMERCIAL

## 2017-09-25 VITALS
OXYGEN SATURATION: 97 % | HEART RATE: 85 BPM | WEIGHT: 132 LBS | SYSTOLIC BLOOD PRESSURE: 101 MMHG | DIASTOLIC BLOOD PRESSURE: 67 MMHG

## 2017-09-25 DIAGNOSIS — O34.219 PREVIOUS CESAREAN DELIVERY, ANTEPARTUM CONDITION OR COMPLICATION: Primary | ICD-10-CM

## 2017-09-25 PROCEDURE — 99207 ZZC PRENATAL VISIT: CPT | Performed by: OBSTETRICS & GYNECOLOGY

## 2017-09-25 NOTE — PROGRESS NOTES
No complaints.  Discussed recommendation for RCS at 41 weeks if undelivered, they agree with plan.  RTC weekly.  AM

## 2017-09-25 NOTE — MR AVS SNAPSHOT
"              After Visit Summary   2017    Jean-Paul Jimenez    MRN: [id number]           Patient Information     Date Of Birth          1990        Visit Information        Provider Department      2017 10:30 AM [facility]        Today's Diagnoses     Previous  delivery, antepartum condition or complication    -  1       Follow-ups after your visit        Your next 10 appointments already scheduled     2017   Procedure with Colette Clement MD   [facility] (--)    [address]  [place] [address] [phone]              Who to contact     If you have questions or need follow up information about today's clinic visit or your schedule please contact [facility] directly at [phone].  Normal or non-critical lab and imaging results will be communicated to you by MyChart, letter or phone within 4 business days after the clinic has received the results. If you do not hear from us within 7 days, please contact the clinic through MyChart or phone. If you have a critical or abnormal lab result, we will notify you by phone as soon as possible.  Submit refill requests through [employer] or call your pharmacy and they will forward the refill request to us. Please allow 3 business days for your refill to be completed.          Additional Information About Your Visit        MyChart Information     [employer] lets you send messages to your doctor, view your test results, renew your prescriptions, schedule appointments and more. To sign up, go to [email or web address] . Click on \"Log in\" on the left side of the screen, which will take you to the Welcome page. Then click on \"Sign up Now\" on the right side of the page.     You will be asked to enter the access code listed below, as well as some personal information. Please follow the directions to create your username and password.     Your access code is: [email or web address]  Expires: 2017  " 8:01 PM     Your access code will  in 90 days. If you need help or a new code, please call your [place] clinic or [phone].        Care EveryWhere ID     This is your Care EveryWhere ID. This could be used by other organizations to access your [place] medical records  [id number]        Your Vitals Were     Pulse Last Period Pulse Oximetry Breastfeeding?          85 2017 97% No         Blood Pressure from Last 3 Encounters:   17 101/67   17 97/61   17 102/63    Weight from Last 3 Encounters:   17 132 lb (59.9 kg)   17 131 lb (59.4 kg)   17 131 lb (59.4 kg)              Today, you had the following     No orders found for display       Primary Care Provider    None Specified       No primary provider on file.        Equal Access to Services     KENDALL HOYOS : Hadii benny Abdi, waaleida lomas, chacho kaalandres starks, nikita barrientos . So [facility] [phone].    ATENCIÓN: Si habla español, tiene a stahl disposición servicios gratuitos de asistencia lingüística. Brynn al [phone].    We comply with applicable federal civil rights laws and [place] laws. We do not discriminate on the basis of race, color, national origin, age, disability sex, sexual orientation or gender identity.            Thank you!     Thank you for choosing [facility]  for your care. Our goal is always to provide you with excellent care. Hearing back from our patients is one way we can continue to improve our services. Please take a few minutes to complete the written survey that you may receive in the mail after your visit with us. Thank you!             Your Updated Medication List - Protect others around you: Learn how to safely use, store and throw away your medicines at www.disposemymeds.org.          This list is accurate as of: 17 11:02 AM.  Always use your most recent med list.                   Brand Name Dispense  Instructions for use Diagnosis    GNP PRENATAL VITAMINS 28-0.8 MG Tabs     100 tablet    Take 1 tablet by mouth daily    Supervision of other normal pregnancy, antepartum

## 2017-10-03 ENCOUNTER — PRENATAL OFFICE VISIT (OUTPATIENT)
Dept: OBGYN | Facility: CLINIC | Age: 27
End: 2017-10-03
Payer: COMMERCIAL

## 2017-10-03 VITALS
DIASTOLIC BLOOD PRESSURE: 70 MMHG | HEART RATE: 91 BPM | WEIGHT: 135 LBS | SYSTOLIC BLOOD PRESSURE: 109 MMHG | OXYGEN SATURATION: 98 %

## 2017-10-03 DIAGNOSIS — O34.219 PREVIOUS CESAREAN DELIVERY, ANTEPARTUM CONDITION OR COMPLICATION: Primary | ICD-10-CM

## 2017-10-03 PROCEDURE — T1013 SIGN LANG/ORAL INTERPRETER: HCPCS | Mod: U3 | Performed by: OBSTETRICS & GYNECOLOGY

## 2017-10-03 PROCEDURE — 99207 ZZC PRENATAL VISIT: CPT | Performed by: OBSTETRICS & GYNECOLOGY

## 2017-10-03 NOTE — MR AVS SNAPSHOT
After Visit Summary   10/3/2017    Jean-Paul Jimenez    MRN: [id number]           Patient Information     Date Of Birth          1990        Visit Information        Provider Department      10/3/2017 10:45 AM Nevin Ospina; Colette Clement MD [facility]        Today's Diagnoses     Previous  delivery, antepartum condition or complication    -  1       Follow-ups after your visit        Your next 10 appointments already scheduled     Oct 10, 2017 10:00 AM CDT   ESTABLISHED PRENATAL with Judy Saleem MD   [facility])    [address] [place] [address] [address]  [facility] [address]   [phone]            Oct 17, 2017 10:00 AM CDT   ESTABLISHED PRENATAL with Judy Saleem MD   [facility] ([facility])    [address] [place] [address]  [facility] [address] [phone]            Oct 24, 2017 10:45 AM CDT   ESTABLISHED PRENATAL with Colette Clement MD   [facility] ([facility])    [address] [place] [address]  [facility] [address] [phone]            Oct 30, 2017 10:00 AM CDT   ESTABLISHED PRENATAL with Colette Clement MD   [facility] ([facility])    [address] [place] [address]  [facility] [address] [phone]            2017 10:00 AM CST   ESTABLISHED PRENATAL with Divya Ruiz MD   [facility] ([facility])    [address] [place] [address] [address]  [facility] [address] [phone]            2017 10:00 AM CST   ESTABLISHED PRENATAL with Colette Clement MD   [facility] ([facility])    [address] [place] [address] [address]  [facility] [address] [phone]            2017   Procedure with Colette Clement MD   [facility] 4[facility] (--)    [address] [place] [address] Ave  [facility] [place] [address] [phone]              Who to contact      "If you have questions or need follow up information about today's clinic visit or your schedule please contact [facility] directly at [phone].  Normal or non-critical lab and imaging results will be communicated to you by MyChart, letter or phone within 4 business days after the clinic has received the results. If you do not hear from us within 7 days, please contact the clinic through [employer] or phone. If you have a critical or abnormal lab result, we will notify you by phone as soon as possible.  Submit refill requests through [employer] or call your pharmacy and they will forward the refill request to us. Please allow 3 business days for your refill to be completed.          Additional Information About Your Visit        StageitharPinPay Information     [employer] lets you send messages to your doctor, view your test results, renew your prescriptions, schedule appointments and more. To sign up, go to [email or web address] . Click on \"Log in\" on the left side of the screen, which will take you to the Welcome page. Then click on \"Sign up Now\" on the right side of the page.     You will be asked to enter the access code listed below, as well as some personal information. Please follow the directions to create your username and password.     Your access code is: [email or web address]  Expires: 2017  8:01 PM     Your access code will  in 90 days. If you need help or a new code, please call your [place] clinic or [phone].        Care EveryWhere ID     This is your Care EveryWhere ID. This could be used by other organizations to access your [place] medical records  [id number]        Your Vitals Were     Pulse Last Period Pulse Oximetry Breastfeeding?          91 2017 98% No         Blood Pressure from Last 3 Encounters:   10/03/17 109/70   17 101/67   17 97/61    Weight from Last 3 Encounters:   10/03/17 135 lb (61.2 kg)   17 132 lb (59.9 kg)   17 131 lb (59.4 kg)    "           Today, you had the following     No orders found for display       Primary Care Provider    None Specified       No primary provider on file.        Equal Access to Services     KENDALL HOYOS : Hadii aad ku hadfreddyterrance Abdi, cocoda abebe, lolapelon carusomary carmenjacquelyn starks, nikita bender alyssasaravanan lunabrendachuck vail. So [facility] [phone].    ATENCIÓN: Si habla español, tiene a stahl disposición servicios gratuitos de asistencia lingüística. Llame al [phone].    We comply with applicable federal civil rights laws and [place] laws. We do not discriminate on the basis of race, color, national origin, age, disability, sex, sexual orientation, or gender identity.            Thank you!     Thank you for choosing [facility]  for your care. Our goal is always to provide you with excellent care. Hearing back from our patients is one way we can continue to improve our services. Please take a few minutes to complete the written survey that you may receive in the mail after your visit with us. Thank you!             Your Updated Medication List - Protect others around you: Learn how to safely use, store and throw away your medicines at www.disposemymeds.org.          This list is accurate as of: 10/3/17 11:09 AM.  Always use your most recent med list.                   Brand Name Dispense Instructions for use Diagnosis    GNP PRENATAL VITAMINS 28-0.8 MG Tabs     100 tablet    Take 1 tablet by mouth daily    Supervision of other normal pregnancy, antepartum

## 2017-10-17 ENCOUNTER — PRENATAL OFFICE VISIT (OUTPATIENT)
Dept: OBGYN | Facility: CLINIC | Age: 27
End: 2017-10-17
Payer: COMMERCIAL

## 2017-10-17 VITALS
HEART RATE: 67 BPM | SYSTOLIC BLOOD PRESSURE: 105 MMHG | WEIGHT: 137 LBS | TEMPERATURE: 97.7 F | DIASTOLIC BLOOD PRESSURE: 71 MMHG

## 2017-10-17 DIAGNOSIS — O34.219 PREVIOUS CESAREAN DELIVERY, ANTEPARTUM CONDITION OR COMPLICATION: Primary | ICD-10-CM

## 2017-10-17 LAB — HGB BLD-MCNC: 12.3 G/DL (ref 11.7–15.7)

## 2017-10-17 PROCEDURE — 99207 ZZC PRENATAL VISIT: CPT | Performed by: OBSTETRICS & GYNECOLOGY

## 2017-10-17 PROCEDURE — 36416 COLLJ CAPILLARY BLOOD SPEC: CPT | Performed by: OBSTETRICS & GYNECOLOGY

## 2017-10-17 PROCEDURE — 87653 STREP B DNA AMP PROBE: CPT | Performed by: OBSTETRICS & GYNECOLOGY

## 2017-10-17 PROCEDURE — 00000218 ZZHCL STATISTIC OBHBG - HEMOGLOBIN: Performed by: OBSTETRICS & GYNECOLOGY

## 2017-10-17 NOTE — PROGRESS NOTES
No contractions, vaginal bleeding or leakage of fluid.  Good fetal movement.  Taking baby ASA.  No symptoms of pre-eclampsia.  Confirmed desire for TOLAC, if possible.  Group B Strep and Hgb today.  Discussed signs/symptoms of pre-eclampsia, as well as labor.  Gave instructions re when to call.  RTC weekly.

## 2017-10-17 NOTE — MR AVS SNAPSHOT
After Visit Summary   10/17/2017    Jean-Paul Jimenez    MRN: [id number]           Patient Information     Date Of Birth          1990        Visit Information        Provider Department      10/17/2017 9:45 AM Judy Saleem MD; LANGUAGE [facility]        Today's Diagnoses     Previous  delivery, antepartum condition or complication    -  1       Follow-ups after your visit        Follow-up notes from your care team     Return in about 1 week (around 10/24/2017).      Your next 10 appointments already scheduled     Oct 24, 2017 10:45 AM CDT   ESTABLISHED PRENATAL with Colette Clement MD   [facility])    [address] [place] [address]  [facility] [id number] [phone]            Oct 30, 2017 10:00 AM CDT   ESTABLISHED PRENATAL with Colette Clement MD   [facility])    [address] [place] [address]  [facility] [address] [phone]            2017 10:00 AM CST   ESTABLISHED PRENATAL with Divya Ruiz MD   [facility]    [address] [place] [address]  [facility] [address] [phone]            2017 10:00 AM CST   ESTABLISHED PRENATAL with Colette Clement MD   [facility] ([facility])    [address] [place] [address]  [facility] [address] [phone]            2017   Procedure with Colette Clement MD   UR 4COB (--)    [address] [place], [place] [address]  [facility] [address] [phone]              Who to contact     If you have questions or need follow up information about today's clinic visit or your schedule please contact [facility] directly at [phone].  Normal or non-critical lab and imaging results will be communicated to you by MyChart, letter or phone within 4 business days after the clinic has received the results. If you do not hear  "from us within 7 days, please contact the clinic through [employer] or phone. If you have a critical or abnormal lab result, we will notify you by phone as soon as possible.  Submit refill requests through [employer] or call your pharmacy and they will forward the refill request to us. Please allow 3 business days for your refill to be completed.          Additional Information About Your Visit        [employer] Information     [employer] lets you send messages to your doctor, view your test results, renew your prescriptions, schedule appointments and more. To sign up, go to [email or web address] . Click on \"Log in\" on the left side of the screen, which will take you to the Welcome page. Then click on \"Sign up Now\" on the right side of the page.     You will be asked to enter the access code listed below, as well as some personal information. Please follow the directions to create your username and password.     Your access code is: [email or web address]  Expires: 2017  8:01 PM     Your access code will  in 90 days. If you need help or a new code, please call your [place] clinic or [phone].        Care EveryWhere ID     This is your Care EveryWhere ID. This could be used by other organizations to access your [place] medical records  [id number]        Your Vitals Were     Pulse Temperature Last Period             67 97.7  F (36.5  C) (Oral) 2017          Blood Pressure from Last 3 Encounters:   10/17/17 105/71   10/03/17 109/70   17 101/67    Weight from Last 3 Encounters:   10/17/17 137 lb (62.1 kg)   10/03/17 135 lb (61.2 kg)   17 132 lb (59.9 kg)              We Performed the Following     Group B strep PCR     OB hemoglobin        Primary Care Provider    None Specified       No primary provider on file.        Equal Access to Services     [facility] : Marci Abdi, lore lomas, qaybta kaalmajacquelyn starks, nikita vail. So wac " [phone].    ATENCIÓN: Si mekala joan, tiene a stahl disposición servicios gratuitos de asistencia lingüística. Brynn al [phone].    We comply with applicable federal civil rights laws and [place] laws. We do not discriminate on the basis of race, color, national origin, age, disability, sex, sexual orientation, or gender identity.            Thank you!     Thank you for choosing [facility]  for your care. Our goal is always to provide you with excellent care. Hearing back from our patients is one way we can continue to improve our services. Please take a few minutes to complete the written survey that you may receive in the mail after your visit with us. Thank you!             Your Updated Medication List - Protect others around you: Learn how to safely use, store and throw away your medicines at www.disposemymeds.org.          This list is accurate as of: 10/17/17 11:37 AM.  Always use your most recent med list.                   Brand Name Dispense Instructions for use Diagnosis    GNP PRENATAL VITAMINS 28-0.8 MG Tabs     100 tablet    Take 1 tablet by mouth daily    Supervision of other normal pregnancy, antepartum

## 2017-10-18 LAB
GP B STREP DNA SPEC QL NAA+PROBE: NEGATIVE
SPECIMEN SOURCE: NORMAL

## 2017-10-24 ENCOUNTER — PRENATAL OFFICE VISIT (OUTPATIENT)
Dept: OBGYN | Facility: CLINIC | Age: 27
End: 2017-10-24
Payer: COMMERCIAL

## 2017-10-24 VITALS
SYSTOLIC BLOOD PRESSURE: 105 MMHG | OXYGEN SATURATION: 99 % | WEIGHT: 139 LBS | DIASTOLIC BLOOD PRESSURE: 68 MMHG | HEART RATE: 81 BPM

## 2017-10-24 DIAGNOSIS — O34.219 PREVIOUS CESAREAN DELIVERY, ANTEPARTUM CONDITION OR COMPLICATION: Primary | ICD-10-CM

## 2017-10-24 PROCEDURE — T1013 SIGN LANG/ORAL INTERPRETER: HCPCS | Mod: U3 | Performed by: OBSTETRICS & GYNECOLOGY

## 2017-10-24 PROCEDURE — 99207 ZZC PRENATAL VISIT: CPT | Performed by: OBSTETRICS & GYNECOLOGY

## 2017-10-24 NOTE — MR AVS SNAPSHOT
After Visit Summary   10/24/2017    Jean-Paul Jimenez    MRN: [id number]           Patient Information     Date Of Birth          1990        Visit Information        Provider Department      10/24/2017 10:30 AM Nevin Ospina; Colette Clement MD [facility]        Today's Diagnoses     Previous  delivery, antepartum condition or complication    -  1       Follow-ups after your visit        Your next 10 appointments already scheduled     Oct 30, 2017 10:00 AM CDT   ESTABLISHED PRENATAL with Colette Clement MD   [facility] ([facility]    [address] [place] [id number] [phone]            2017 10:00 AM CST   ESTABLISHED PRENATAL with Divya Ruiz MD   [facility] ([facility]    [address] [place] [address] [phone]            2017 10:00 AM CST   ESTABLISHED PRENATAL with Colette Clement MD   [facility] ([facility]    [address] [place] [address] [phone]            2017   Procedure with Colette Clement MD   [facility] (--)    [address] [place] [address] Ave  [place] [address] [phone]              Who to contact     If you have questions or need follow up information about today's clinic visit or your schedule please contact [facility] directly at [phone].  Normal or non-critical lab and imaging results will be communicated to you by MyChart, letter or phone within 4 business days after the clinic has received the results. If you do not hear from us within 7 days, please contact the clinic through MyChart or phone. If you have a critical or abnormal lab result, we will notify you by phone as soon as possible.  Submit refill requests through [employer] or call your pharmacy and they will forward the refill request to us. Please allow 3 business days for  "your refill to be completed.          Additional Information About Your Visit        Mogihart Information     [employer] lets you send messages to your doctor, view your test results, renew your prescriptions, schedule appointments and more. To sign up, go to [email or web address] . Click on \"Log in\" on the left side of the screen, which will take you to the Welcome page. Then click on \"Sign up Now\" on the right side of the page.     You will be asked to enter the access code listed below, as well as some personal information. Please follow the directions to create your username and password.     Your access code is: [email or web address]  Expires: 2017  8:01 PM     Your access code will  in 90 days. If you need help or a new code, please call your [place] clinic or [phone].        Care EveryWhere ID     This is your Care EveryWhere ID. This could be used by other organizations to access your [place] medical records  [id number]        Your Vitals Were     Pulse Last Period Pulse Oximetry Breastfeeding?          81 2017 99% No         Blood Pressure from Last 3 Encounters:   10/24/17 105/68   10/17/17 105/71   10/03/17 109/70    Weight from Last 3 Encounters:   10/24/17 139 lb (63 kg)   10/17/17 137 lb (62.1 kg)   10/03/17 135 lb (61.2 kg)              Today, you had the following     No orders found for display       Primary Care Provider    None Specified       No primary provider on file.        Equal Access to Services     [facility]: Hadii benny ku hadasho Sopaulinaali, waaxda luqadaha, qaybta kaalmada adeegyada, nikita barrientos . So [facility] [phone].    ATENCIÓN: Si habla español, tiene a stahl disposición servicios gratuitos de asistencia lingüística. Llame al [phone].    We comply with applicable federal civil rights laws and [place] laws. We do not discriminate on the basis of race, color, national origin, age, disability, sex, sexual orientation, or gender " identity.            Thank you!     Thank you for choosing [facility]  for your care. Our goal is always to provide you with excellent care. Hearing back from our patients is one way we can continue to improve our services. Please take a few minutes to complete the written survey that you may receive in the mail after your visit with us. Thank you!             Your Updated Medication List - Protect others around you: Learn how to safely use, store and throw away your medicines at www.disposemymeds.org.          This list is accurate as of: 10/24/17 11:07 AM.  Always use your most recent med list.                   Brand Name Dispense Instructions for use Diagnosis    GNP PRENATAL VITAMINS 28-0.8 MG Tabs     100 tablet    Take 1 tablet by mouth daily    Supervision of other normal pregnancy, antepartum

## 2017-10-30 ENCOUNTER — PRENATAL OFFICE VISIT (OUTPATIENT)
Dept: OBGYN | Facility: CLINIC | Age: 27
End: 2017-10-30
Payer: COMMERCIAL

## 2017-10-30 VITALS
DIASTOLIC BLOOD PRESSURE: 71 MMHG | SYSTOLIC BLOOD PRESSURE: 104 MMHG | WEIGHT: 143 LBS | HEART RATE: 54 BPM | OXYGEN SATURATION: 100 %

## 2017-10-30 DIAGNOSIS — O34.219 PREVIOUS CESAREAN DELIVERY, ANTEPARTUM CONDITION OR COMPLICATION: Primary | ICD-10-CM

## 2017-10-30 PROCEDURE — 99207 ZZC PRENATAL VISIT: CPT | Performed by: OBSTETRICS & GYNECOLOGY

## 2017-10-30 NOTE — MR AVS SNAPSHOT
After Visit Summary   10/30/2017    Jean-Paul Jimenez    MRN: [id number]           Patient Information     Date Of Birth          1990        Visit Information        Provider Department      10/30/2017 9:45 AM Colette Clement MD; [place] [employer] [facility]        Today's Diagnoses     Previous  delivery, antepartum condition or complication    -  1       Follow-ups after your visit        Your next 10 appointments already scheduled     2017 10:00 AM CST   ESTABLISHED PRENATAL with Divya Ruiz MD   [facility] ([facility])    [address] [place] [address] 700  [facility] [address] [phone]            2017 10:00 AM CST   ESTABLISHED PRENATAL with Colette Clement MD   [facility] ([facility])    [address] [place] [address] [phone]            2017   Procedure with Colette Clement MD   [facility] 4COB (--)    [address] [place] [address] Ave  [place] [address] [phone]              Who to contact     If you have questions or need follow up information about today's clinic visit or your schedule please contact [facility] directly at [phone].  Normal or non-critical lab and imaging results will be communicated to you by Weekend-a-gogohart, letter or phone within 4 business days after the clinic has received the results. If you do not hear from us within 7 days, please contact the clinic through Weekend-a-gogohart or phone. If you have a critical or abnormal lab result, we will notify you by phone as soon as possible.  Submit refill requests through [employer] or call your pharmacy and they will forward the refill request to us. Please allow 3 business days for your refill to be completed.          Additional Information About Your Visit        Weekend-a-gogoharWerkadoo Information     [employer] lets you send messages to your doctor, view your test results, renew your  "prescriptions, schedule appointments and more. To sign up, go to [email or web address] . Click on \"Log in\" on the left side of the screen, which will take you to the Welcome page. Then click on \"Sign up Now\" on the right side of the page.     You will be asked to enter the access code listed below, as well as some personal information. Please follow the directions to create your username and password.     Your access code is: [email or web address]  Expires: 2017  8:01 PM     Your access code will  in 90 days. If you need help or a new code, please call your [place] clinic or [phone].        Care EveryWhere ID     This is your Care EveryWhere ID. This could be used by other organizations to access your [place] medical records  [id number]        Your Vitals Were     Pulse Last Period Pulse Oximetry Breastfeeding?          54 2017 100% No         Blood Pressure from Last 3 Encounters:   10/30/17 104/71   10/24/17 105/68   10/17/17 105/71    Weight from Last 3 Encounters:   10/30/17 143 lb (64.9 kg)   10/24/17 139 lb (63 kg)   10/17/17 137 lb (62.1 kg)              Today, you had the following     No orders found for display       Primary Care Provider Office Phone # Fax #    [facility] [phone]       [address] [address]        Equal Access to Services     [facility]END[facility]: Hadii aad ku hadasho Soomaali, waaxda luqadaha, qaybta kaalmada adeegyada, nikita bender haymaria fernanda barrientos . So [facility] [phone].    ATENCIÓN: Si habla español, tiene a stahl disposición servicios gratuitos de asistencia lingüística. Llame al [phone].    We comply with applicable federal civil rights laws and [place] laws. We do not discriminate on the basis of race, color, national origin, age, disability, sex, sexual orientation, or gender identity.            Thank you!     Thank you for choosing [facility]  for your care. Our goal is always " to provide you with excellent care. Hearing back from our patients is one way we can continue to improve our services. Please take a few minutes to complete the written survey that you may receive in the mail after your visit with us. Thank you!             Your Updated Medication List - Protect others around you: Learn how to safely use, store and throw away your medicines at www.disposemymeds.org.          This list is accurate as of: 10/30/17 10:32 AM.  Always use your most recent med list.                   Brand Name Dispense Instructions for use Diagnosis    GNP PRENATAL VITAMINS 28-0.8 MG Tabs     100 tablet    Take 1 tablet by mouth daily    Supervision of other normal pregnancy, antepartum

## 2017-11-09 ENCOUNTER — PRENATAL OFFICE VISIT (OUTPATIENT)
Dept: OBGYN | Facility: CLINIC | Age: 27
End: 2017-11-09
Payer: COMMERCIAL

## 2017-11-09 VITALS
HEART RATE: 70 BPM | SYSTOLIC BLOOD PRESSURE: 108 MMHG | DIASTOLIC BLOOD PRESSURE: 67 MMHG | WEIGHT: 142.9 LBS | TEMPERATURE: 96.6 F

## 2017-11-09 DIAGNOSIS — O34.219 PREVIOUS CESAREAN DELIVERY, ANTEPARTUM CONDITION OR COMPLICATION: Primary | ICD-10-CM

## 2017-11-09 PROCEDURE — 99207 ZZC PRENATAL VISIT: CPT | Performed by: OBSTETRICS & GYNECOLOGY

## 2017-11-09 NOTE — MR AVS SNAPSHOT
"              After Visit Summary   2017    Jean-Paul Jimenez    MRN: [id number]           Patient Information     Date Of Birth          1990        Visit Information        Provider Department      2017 9:45 AM Divya Ruiz MD; [place] [employer]        Today's Diagnoses     Previous  delivery, antepartum condition or complication    -  1       Follow-ups after your visit        Your next 10 appointments already scheduled     2017 10:45 AM CST   ESTABLISHED PRENATAL with Colette Clement MD   [facility] ([facility])    [address] [place] [address] 700  [facility] [address] [phone]            2017   Procedure with Colette Clement MD   [facility] 4[facility]B (--)    [address] [place] [address] Ave  [place] [address] [phone]              Who to contact     If you have questions or need follow up information about today's clinic visit or your schedule please contact [facility] directly at [phone].  Normal or non-critical lab and imaging results will be communicated to you by Mapittrackithart, letter or phone within 4 business days after the clinic has received the results. If you do not hear from us within 7 days, please contact the clinic through Mapittrackithart or phone. If you have a critical or abnormal lab result, we will notify you by phone as soon as possible.  Submit refill requests through [employer] or call your pharmacy and they will forward the refill request to us. Please allow 3 business days for your refill to be completed.          Additional Information About Your Visit        MapittrackitharStreamfile Information     fos4X lets you send messages to your doctor, view your test results, renew your prescriptions, schedule appointments and more. To sign up, go to [email or web address] . Click on \"Log in\" on the left side of the screen, which will take you to the Welcome page. Then click on \"Sign up Now\" on the right side " of the page.     You will be asked to enter the access code listed below, as well as some personal information. Please follow the directions to create your username and password.     Your access code is: [email or web address]  Expires: 2017  7:01 PM     Your access code will  in 90 days. If you need help or a new code, please call your [facility] or [phone].        Care EveryWhere ID     This is your Care EveryWhere ID. This could be used by other organizations to access your [place] medical records  [id number]        Your Vitals Were     Pulse Temperature Last Period             70 96.6  F (35.9  C) (Oral) 2017          Blood Pressure from Last 3 Encounters:   17 108/67   10/30/17 104/71   10/24/17 105/68    Weight from Last 3 Encounters:   17 142 lb 14.4 oz (64.8 kg)   10/30/17 143 lb (64.9 kg)   10/24/17 139 lb (63 kg)              Today, you had the following     No orders found for display       Primary Care Provider Office Phone # Fax #    [facility] [phone]       [address] [address]        Equal Access to Services     KENDALL HOYOS : Hadii aad ku hadasho Soomaali, waaxda luqadaha, qaybta kaalmada adeegyada, waxay idiin hayaan adereza vail. So [facility] [phone].    ATENCIÓN: Si habla español, tiene a stahl disposición servicios gratuitos de asistencia lingüística. Llame al [phone].    We comply with applicable federal civil rights laws and [place] laws. We do not discriminate on the basis of race, color, national origin, age, disability, sex, sexual orientation, or gender identity.            Thank you!     Thank you for choosing [facility]  for your care. Our goal is always to provide you with excellent care. Hearing back from our patients is one way we can continue to improve our services. Please take a few minutes to complete the written survey that you may receive in the mail after  your visit with us. Thank you!             Your Updated Medication List - Protect others around you: Learn how to safely use, store and throw away your medicines at www.disposemymeds.org.          This list is accurate as of: 11/9/17 10:41 AM.  Always use your most recent med list.                   Brand Name Dispense Instructions for use Diagnosis    GNP PRENATAL VITAMINS 28-0.8 MG Tabs     100 tablet    Take 1 tablet by mouth daily    Supervision of other normal pregnancy, antepartum

## 2017-11-12 ENCOUNTER — HOSPITAL ENCOUNTER (INPATIENT)
Facility: CLINIC | Age: 27
LOS: 4 days | Discharge: HOME | End: 2017-11-16
Attending: OBSTETRICS & GYNECOLOGY | Admitting: OBSTETRICS & GYNECOLOGY
Payer: COMMERCIAL

## 2017-11-12 ENCOUNTER — NURSE TRIAGE (OUTPATIENT)
Dept: NURSING | Facility: CLINIC | Age: 27
End: 2017-11-12

## 2017-11-12 ENCOUNTER — OFFICE VISIT (OUTPATIENT)
Dept: INTERPRETER SERVICES | Facility: CLINIC | Age: 27
End: 2017-11-12

## 2017-11-12 DIAGNOSIS — D62 ANEMIA DUE TO BLOOD LOSS, ACUTE: ICD-10-CM

## 2017-11-12 DIAGNOSIS — Z98.891 S/P CESAREAN SECTION: Primary | ICD-10-CM

## 2017-11-12 PROBLEM — Z36.89 ENCOUNTER FOR TRIAGE IN PREGNANT PATIENT: Status: ACTIVE | Noted: 2017-11-12

## 2017-11-12 PROBLEM — Z34.90 PREGNANCY: Status: ACTIVE | Noted: 2017-11-12

## 2017-11-12 LAB
A1 MICROGLOB PLACENTAL VAG QL: POSITIVE
HGB BLD-MCNC: 11.3 G/DL (ref 11.7–15.7)
PLATELET # BLD AUTO: 135 10E9/L (ref 150–450)
T PALLIDUM IGG+IGM SER QL: NEGATIVE

## 2017-11-12 PROCEDURE — 86780 TREPONEMA PALLIDUM: CPT | Performed by: OBSTETRICS & GYNECOLOGY

## 2017-11-12 PROCEDURE — 85018 HEMOGLOBIN: CPT | Performed by: OBSTETRICS & GYNECOLOGY

## 2017-11-12 PROCEDURE — 12000030 ZZH R&B OB INTERMEDIATE UMMC

## 2017-11-12 PROCEDURE — 84112 EVAL AMNIOTIC FLUID PROTEIN: CPT | Performed by: OBSTETRICS & GYNECOLOGY

## 2017-11-12 PROCEDURE — 86850 RBC ANTIBODY SCREEN: CPT | Performed by: OBSTETRICS & GYNECOLOGY

## 2017-11-12 PROCEDURE — 85049 AUTOMATED PLATELET COUNT: CPT | Performed by: OBSTETRICS & GYNECOLOGY

## 2017-11-12 PROCEDURE — 25000128 H RX IP 250 OP 636: Performed by: OBSTETRICS & GYNECOLOGY

## 2017-11-12 PROCEDURE — 86900 BLOOD TYPING SEROLOGIC ABO: CPT | Performed by: OBSTETRICS & GYNECOLOGY

## 2017-11-12 PROCEDURE — 86901 BLOOD TYPING SEROLOGIC RH(D): CPT | Performed by: OBSTETRICS & GYNECOLOGY

## 2017-11-12 PROCEDURE — 25000125 ZZHC RX 250: Performed by: OBSTETRICS & GYNECOLOGY

## 2017-11-12 PROCEDURE — T1013 SIGN LANG/ORAL INTERPRETER: HCPCS | Mod: U3

## 2017-11-12 PROCEDURE — 99215 OFFICE O/P EST HI 40 MIN: CPT

## 2017-11-12 RX ORDER — OXYTOCIN 10 [USP'U]/ML
10 INJECTION, SOLUTION INTRAMUSCULAR; INTRAVENOUS
Status: DISCONTINUED | OUTPATIENT
Start: 2017-11-12 | End: 2017-11-13

## 2017-11-12 RX ORDER — FENTANYL CITRATE 50 UG/ML
100 INJECTION, SOLUTION INTRAMUSCULAR; INTRAVENOUS
Status: DISCONTINUED | OUTPATIENT
Start: 2017-11-12 | End: 2017-11-13

## 2017-11-12 RX ORDER — METHYLERGONOVINE MALEATE 0.2 MG/ML
200 INJECTION INTRAVENOUS
Status: DISCONTINUED | OUTPATIENT
Start: 2017-11-12 | End: 2017-11-13

## 2017-11-12 RX ORDER — SODIUM CHLORIDE, SODIUM LACTATE, POTASSIUM CHLORIDE, CALCIUM CHLORIDE 600; 310; 30; 20 MG/100ML; MG/100ML; MG/100ML; MG/100ML
INJECTION, SOLUTION INTRAVENOUS CONTINUOUS
Status: DISCONTINUED | OUTPATIENT
Start: 2017-11-12 | End: 2017-11-13

## 2017-11-12 RX ORDER — ACETAMINOPHEN 325 MG/1
650 TABLET ORAL EVERY 4 HOURS PRN
Status: DISCONTINUED | OUTPATIENT
Start: 2017-11-12 | End: 2017-11-13

## 2017-11-12 RX ORDER — IBUPROFEN 800 MG/1
800 TABLET, FILM COATED ORAL
Status: DISCONTINUED | OUTPATIENT
Start: 2017-11-12 | End: 2017-11-13

## 2017-11-12 RX ORDER — CARBOPROST TROMETHAMINE 250 UG/ML
250 INJECTION, SOLUTION INTRAMUSCULAR
Status: DISCONTINUED | OUTPATIENT
Start: 2017-11-12 | End: 2017-11-13

## 2017-11-12 RX ORDER — CITRIC ACID/SODIUM CITRATE 334-500MG
30 SOLUTION, ORAL ORAL ONCE
Status: DISCONTINUED | OUTPATIENT
Start: 2017-11-12 | End: 2017-11-13

## 2017-11-12 RX ORDER — OXYCODONE AND ACETAMINOPHEN 5; 325 MG/1; MG/1
1 TABLET ORAL
Status: COMPLETED | OUTPATIENT
Start: 2017-11-12 | End: 2017-11-13

## 2017-11-12 RX ORDER — OXYTOCIN/0.9 % SODIUM CHLORIDE 30/500 ML
1-24 PLASTIC BAG, INJECTION (ML) INTRAVENOUS CONTINUOUS
Status: DISCONTINUED | OUTPATIENT
Start: 2017-11-12 | End: 2017-11-13

## 2017-11-12 RX ORDER — TERBUTALINE SULFATE 1 MG/ML
0.25 INJECTION, SOLUTION SUBCUTANEOUS
Status: DISCONTINUED | OUTPATIENT
Start: 2017-11-12 | End: 2017-11-13

## 2017-11-12 RX ORDER — ONDANSETRON 2 MG/ML
4 INJECTION INTRAMUSCULAR; INTRAVENOUS EVERY 6 HOURS PRN
Status: DISCONTINUED | OUTPATIENT
Start: 2017-11-12 | End: 2017-11-13

## 2017-11-12 RX ORDER — SODIUM CHLORIDE, SODIUM LACTATE, POTASSIUM CHLORIDE, CALCIUM CHLORIDE 600; 310; 30; 20 MG/100ML; MG/100ML; MG/100ML; MG/100ML
INJECTION, SOLUTION INTRAVENOUS
Status: DISCONTINUED
Start: 2017-11-12 | End: 2017-11-12 | Stop reason: HOSPADM

## 2017-11-12 RX ORDER — LIDOCAINE 40 MG/G
CREAM TOPICAL
Status: DISCONTINUED | OUTPATIENT
Start: 2017-11-12 | End: 2017-11-13

## 2017-11-12 RX ORDER — NALOXONE HYDROCHLORIDE 0.4 MG/ML
.1-.4 INJECTION, SOLUTION INTRAMUSCULAR; INTRAVENOUS; SUBCUTANEOUS
Status: DISCONTINUED | OUTPATIENT
Start: 2017-11-12 | End: 2017-11-13

## 2017-11-12 RX ORDER — OXYTOCIN/0.9 % SODIUM CHLORIDE 30/500 ML
100-340 PLASTIC BAG, INJECTION (ML) INTRAVENOUS CONTINUOUS PRN
Status: DISCONTINUED | OUTPATIENT
Start: 2017-11-12 | End: 2017-11-13

## 2017-11-12 RX ADMIN — SODIUM CHLORIDE, POTASSIUM CHLORIDE, SODIUM LACTATE AND CALCIUM CHLORIDE: 600; 310; 30; 20 INJECTION, SOLUTION INTRAVENOUS at 15:08

## 2017-11-12 RX ADMIN — SODIUM CHLORIDE, POTASSIUM CHLORIDE, SODIUM LACTATE AND CALCIUM CHLORIDE: 600; 310; 30; 20 INJECTION, SOLUTION INTRAVENOUS at 22:44

## 2017-11-12 RX ADMIN — FENTANYL CITRATE 100 MCG: 50 INJECTION INTRAMUSCULAR; INTRAVENOUS at 17:14

## 2017-11-12 RX ADMIN — FENTANYL CITRATE 100 MCG: 50 INJECTION INTRAMUSCULAR; INTRAVENOUS at 21:30

## 2017-11-12 RX ADMIN — SODIUM CHLORIDE, POTASSIUM CHLORIDE, SODIUM LACTATE AND CALCIUM CHLORIDE: 600; 310; 30; 20 INJECTION, SOLUTION INTRAVENOUS at 04:15

## 2017-11-12 RX ADMIN — OXYTOCIN-SODIUM CHLORIDE 0.9% IV SOLN 30 UNIT/500ML 1 MILLI-UNITS/MIN: 30-0.9/5 SOLUTION at 11:03

## 2017-11-12 RX ADMIN — ONDANSETRON 4 MG: 2 INJECTION INTRAMUSCULAR; INTRAVENOUS at 21:34

## 2017-11-12 NOTE — PLAN OF CARE
Problem: Labor (Cervical Ripen, Induct, Augment) (Adult,Obstetrics,Pediatric)  Goal: Signs and Symptoms of Listed Potential Problems Will be Absent, Minimized or Managed (Labor)  Signs and symptoms of listed potential problems will be absent, minimized or managed by discharge/transition of care (reference Labor (Cervical Ripen, Induct, Augment) (Adult,Obstetrics,Pediatric) CPG).   Outcome: No Change  Labor is not progressing as expected. Patient is in pain and declined pain meds. See flow sheet for fetal and uterine monitoring. Pitocin started. Will continue to monitor labor and notify the provider with changes.

## 2017-11-12 NOTE — TELEPHONE ENCOUNTER
"  Reason for Disposition    Leakage of fluid from vagina    Additional Information    Negative: [1] SEVERE abdominal pain (e.g., excruciating) AND [2] constant AND [3] present > 1 hour    Negative: Severe bleeding (e.g., continuous red blood from vagina, or large blood clots)    Negative: Umbilical cord hanging out of the vagina (shiny, white, curled appearance, \"like telephone cord\")    Negative: Uncontrollable urge to push (i.e., feels like baby is coming out now)    Negative: Can see baby    Negative: Sounds like a life-threatening emergency to the triager    Negative: Vaginal bleeding    Negative: < 20 weeks pregnant    Answer Assessment - Initial Assessment Questions  1. ONSET: \"When did the symptoms begin?\"         Just now  2. CONTRACTIONS: \"Are you having any contractions?\" If yes, ask: \"Describe the contractions that you are having.\" (e.g., duration, frequency, regularity, severity)      no  3. DEEDEE: \"What date are you expecting to deliver?\"      11/9/17  4. PARITY: \"Have you had a baby before?\" If yes, \"How long did the labor last?\"      ?  5. FETAL MOVEMENT: \"Has the baby's movement decreased or changed significantly from normal?\"      ok  6. OTHER SYMPTOMS: \"Do you have any other symptoms?\" (e.g., abdominal pain, vaginal bleeding, fever, hand/facial swelling)      no    Protocols used: PREGNANCY - RUPTURE OF MEMBRANES-ADULT-AH    "

## 2017-11-12 NOTE — PLAN OF CARE
Problem: Labor (Cervical Ripen, Induct, Augment) (Adult,Obstetrics,Pediatric)  Goal: Signs and Symptoms of Listed Potential Problems Will be Absent, Minimized or Managed (Labor)  Signs and symptoms of listed potential problems will be absent, minimized or managed by discharge/transition of care (reference Labor (Cervical Ripen, Induct, Augment) (Adult,Obstetrics,Pediatric) CPG).   Outcome: No Change  Labor is not progressing as excepted. See flow sheet for fetal and uterine monitoring. Patient is in pain and declined pain med. Family is supporting. She is up ad tommie ambulating. Continue to monitor labor.

## 2017-11-12 NOTE — PROGRESS NOTES
"Brief Labor Progress Note      S: In pain and stating some fatigue.  Feels that she has very little energy.  Has been walking in the hallways.  Declining pain medications at this time, despite discussing options for a second time.  Even declining tylenol and heat packs. Patient desires to proceed with TOLAC.       O:  Vitals:    17 0615 17 0909 17 1133 17 1503   BP:  119/72  102/64   Resp:       Temp: 98.3  F (36.8  C) 97.4  F (36.3  C)  98.1  F (36.7  C)   TempSrc: Oral Oral  Oral   Height:   1.626 m (5' 4\")        Cervix: closed/50/-3 (1010, 1630)  Membranes: SROM 0200    FHR Baseline: 140s  Variability: Moderate  Accels: present  Decels: absent  [place]: contractions 4-5/10 min    A/P:  Jean-Paul Jimenez is a 27 year old  at 40w3d here for PROM, with still minimal cervical change.     Labor: PROM, now with good contraction pattern, but no cervical change. Patient feeling tired, but still desires TOLAC. Rediscussed pain meds with patient who is declining even tylenol and heat packs at this time.   - Continue Pitocin titration  - Next SVE in 4-6 hrs or with maternal or fetal indications  - Anticipate     FWB:  - Cat I, reactive  - Continuous fetal monitoring    Amber Garza MD MPH  OB-GYN PGY-3  17  4:33 PM          "

## 2017-11-12 NOTE — PLAN OF CARE
Problem: Patient Care Overview  Goal: Plan of Care/Patient Progress Review  Outcome: No Change  VSS.  FHT- category 1.  Contractions are increasing in frequency.  Patient is able to sleep through contractions.  Plan to start oxytocin later to day if contractions continue to be spaced out. Anticipate .

## 2017-11-12 NOTE — PROGRESS NOTES
Brief Labor Progress Note      S: Doing well. Having labor pain and pressure with each contraction.  Denies wanting pain medications at this time.   Discussed for over 30 mins re: pain medications, side effects, and that she can ask for the pain meds. Also, discussed that since her cervix is still closed despite contractions, will need       O:  Vitals:    17 0249 17 0315 17 0615   BP: 112/67     Resp:  16    Temp:  98  F (36.7  C) 98.3  F (36.8  C)   TempSrc:  Oral Oral       Cervix: closed/50/-3 (1010)  Membranes: SROM 0200    FHR Baseline: 130s  Variability: Moderate  Accels: absent  Decels: absent  [place]: contractions 3/10 min    A/P:  Jean-Paul Jimenez is a 27 year old  at 40w3d here for PROM, now with spontaneous progression into labor over several hours of observation, however minimal cervical change.    Labor: PROM, now with increasing frequency of contractions  - Plan for Pitocin as cervix not changing  - Next SVE in 4-6 hrs or with maternal or fetal indications  - Anticipate     FWB:  - Cat I, non reactive  - Continuous fetal monitoring    Amber Garza MD MPH  OB-GYN PGY-3  17  8:57 AM      Attending Staff  I have seen and examined the patient along with the resident, ~ 1000.   I agree with the above note.    We used and interpretor.  On exam my EFW is ~ 7.75 lbs.  Patient is a very petite person.  Cervix is not favorable after ~ 8 hrs post rupture now and essentially no cervix change.    I discussed with them that given her cervix is closed, this process can take many hours and there is increased risk for infection the longer out from rupture the labor goes.  That is why we need to augment with pitocin.    stated that he feels she should go ahead with a repeat  section at this time to avoid any complications.  Patient would like to continue TOLAC.   Baby is not showing signes of distress so ok to let patient continue on pitocin.   Fetal status shows NST is  reassuring, toco: with frequent ctx's and negative CST    Continuous monitoring during labor.  plts stable.    Results for orders placed or performed during the hospital encounter of 11/12/17   Anti Treponema   Result Value Ref Range    Treponema pallidum Antibody Negative NEG^Negative   Hemoglobin   Result Value Ref Range    Hemoglobin 11.3 (L) 11.7 - 15.7 g/dL   Platelet count   Result Value Ref Range    Platelet Count 135 (L) 150 - 450 10e9/L   ABO/Rh type and screen   Result Value Ref Range    ABO A     RH(D) Pos     Antibody Screen Neg     Test Valid Only At          [facility]    Specimen Expires 11/15/2017    Rupture of membranes by Amnisure   Result Value Ref Range    Amnisure Positive (A) NEG^Negative        Urmila Garcia MD

## 2017-11-12 NOTE — PLAN OF CARE
Problem: Patient Care Overview  Goal: Plan of Care/Patient Progress Review  Outcome: No Change  Data: Patient presented to [facility] at 0234.   Reason for maternal/fetal assessment per patient is Rule out rupture of membranes  .  Patient is a . Prenatal record reviewed.      Obstetric History       T1      L1     SAB0   TAB0   Ectopic0   Multiple0   Live Births1        # Outcome Date GA Lbr Maurizio/2nd Weight Sex Delivery Anes PTL Lv   2 Current                     1 Term                 HEYDI       . Medical history:   Past Medical History:   Diagnosis Date     Hypertension       HELLP in previous pregnancy   . Gestational Age 40w3d. VSS. Fetal movement present. Patient denies cramping, backache, vaginal discharge, pelvic pressure, UTI symptoms, GI problems, bloody show, vaginal bleeding, edema, headache, visual disturbances, epigastric or URQ pain, abdominal pain, . Support persons [place] present.  Action: Verbal consent for EFM. Triage assessment completed. EFM applied for category 1 strip. Uterine assessment showed rare contractions. Fetal assessment: Presumed adequate fetal oxygenation documented (see flow record).   Response: Dr. JAYSON Lua and Dr. Ruiz informed of positive amnisure. Plan per provider is admit for TOLAC. Patient verbalized agreement with plan. Patient transferred to room 477 ambulatory, oriented to room and call light.

## 2017-11-12 NOTE — IP AVS SNAPSHOT
MRN:[id number]                      After Visit Summary   2017    Jean-Paul Jimenez    MRN: [id number]           Thank you!     Thank you for choosing [facility] for your care. Our goal is always to provide you with excellent care. Hearing back from our patients is one way we can continue to improve our services. Please take a few minutes to complete the written survey that you may receive in the mail after you visit with us. Thank you!        Patient Information     Date Of Birth          1990        Designated Caregiver       Most Recent Value    Caregiver    Will someone help with your care after discharge? no      About your hospital stay     You were admitted on:  2017 You last received care in the:  [facility]    You were discharged on:  2017        Reason for your hospital stay       Maternity care                  Who to Call     For medical emergencies, please call 911.  For non-urgent questions about your medical care, please call your primary care provider or clinic, None  For questions related to your surgery, please call your surgery clinic        Attending Provider     Provider Divya Stoner MD OB/Gyn    Urmila Garcia MD OB/Gyn       Primary Care Provider    Physician No Ref-Primary      After Care Instructions     Activity       Review discharge instructions            Diet       Resume previous diet            Discharge Instructions - Postpartum visit       Schedule postpartum visit with your provider and return to clinic in 6 weeks.                  Follow-up Appointments     Follow Up and recommended labs and tests       Follow-up for 6 week postpartum visit                  Further instructions from your care team       Postop  Birth Instructions    Activity       Do not lift more than 10 pounds for 6 weeks after surgery.  Ask family and friends for help when you need it.    No driving until you have stopped taking  your pain medications (usually two weeks after surgery).    No heavy exercise or activity for 6 weeks.  Don't do anything that will put a strain on your surgery site.    Don't strain when using the toilet.  Your care team may prescribe a stool softener if you have problems with your bowel movements.     To care for your incision:       Keep the incision clean and dry.    Do not soak your incision in water. No swimming or hot tubs until it has fully healed. You may soak in the bathtub if the water level is below your incision.    Do not use peroxide, gel, cream, lotion, or ointment on your incision.    Adjust your clothes to avoid pressure on your surgery site (check the elastic in your underwear for example).     You may see a small amount of clear or pink drainage and this is normal.  Check with your health care provider:       If the drainage increases or has an odor.    If the incision reddens, you have swelling, or develop a rash.    If you have increased pain and the medicine we prescribed doesn't help.    If you have a fever above 100.4 F (38 C) with or without chills when placing thermometer under your tongue.   The area around your incision (surgery wound), will feel numb.  This is normal. The numbness should go away in less than a year.     Keep your hands clean:  Always wash your hands before touching your incision (surgery wound). This helps reduce your risk of infection. If your hands aren't dirty, you may use an alcohol hand-rub to clean your hands. Keep your nails clean and short.    Call your healthcare provider if you have any of these symptoms:       You soak a sanitary pad with blood within 1 hour, or you see blood clots larger than a golf ball.    Bleeding that lasts more than 6 weeks.    Vaginal discharge that smells bad.    Severe pain, cramping or tenderness in your lower belly area.    A need to urinate more frequently (use the toilet more often), more urgently (use the toilet very quickly),  "or it burns when you urinate.    Nausea and vomiting.    Redness, swelling or pain around a vein in your leg.    Problems breastfeeding or a red or painful area on your breast.    Chest pain and cough or are gasping for air.    Problems with coping with sadness, anxiety or depression. If you have concerns about hurting yourself or the baby, call your provider immediately.      You have questions or concerns after you return home.                  Pending Results     No orders found from 11/10/2017 to 2017.            Statement of Approval     Ordered          17 1001  I have reviewed and agree with all the recommendations and orders detailed in this document.  EFFECTIVE NOW     Approved and electronically signed by:  Judy Hernandez MD             Admission Information     Date & Time Provider Department Dept. Phone    2017 Urmila Garcia MD [facility] [phone]      Your Vitals Were     Blood Pressure Pulse Temperature Respirations Height Last Period    109/72 70 97.5  F (36.4  C) (Oral) 18 1.626 m (5' 4\") 2017    Pulse Oximetry                   98%           MyChart Information     [employer] lets you send messages to your doctor, view your test results, renew your prescriptions, schedule appointments and more. To sign up, go to [email or web address] . Click on \"Log in\" on the left side of the screen, which will take you to the Welcome page. Then click on \"Sign up Now\" on the right side of the page.     You will be asked to enter the access code listed below, as well as some personal information. Please follow the directions to create your username and password.     Your access code is: [email or web address]  Expires: 2017  7:01 PM     Your access code will  in 90 days. If you need help or a new code, please call your [facility] clinic or [phone].        Care EveryWhere ID     This is your Care EveryWhere ID. This could be used by other organizations to access your " [facility] medical records  [id number]        Equal Access to Services     KENDALL SOHA : Hadii aad ku hadfreddyterrance Sodarell, waaxda luqadaha, qaybta kaalmada tereza, nikita lunabrendachuck vail. So [facility] [phone].    ATENCIÓN: Si habla español, tiene a stahl disposición servicios gratuitos de asistencia lingüística. Llame al [phone].    We comply with applicable federal civil rights laws and [place] laws. We do not discriminate on the basis of race, color, national origin, age, disability, sex, sexual orientation, or gender identity.               Review of your medicines      START taking        Dose / Directions    acetaminophen 325 MG tablet   Commonly known as:  TYLENOL        Dose:  650 mg   Take 2 tablets (650 mg) by mouth every 4 hours as needed for other (surgical pain)   Quantity:  100 tablet   Refills:  0       ferrous sulfate 325 (65 FE) MG tablet   Commonly known as:  IRON   Used for:  Anemia due to blood loss, acute        Dose:  325 mg   Take 1 tablet (325 mg) by mouth daily (with breakfast)   Quantity:  60 tablet   Refills:  0       ibuprofen 600 MG tablet   Commonly known as:  ADVIL/MOTRIN        Dose:  600 mg   Take 1 tablet (600 mg) by mouth every 6 hours as needed for moderate pain   Quantity:  100 tablet   Refills:  1       oxyCODONE IR 5 MG tablet   Commonly known as:  ROXICODONE        Dose:  5-10 mg   Take 1-2 tablets (5-10 mg) by mouth every 4 hours as needed for moderate to severe pain   Quantity:  20 tablet   Refills:  0       senna-docusate 8.6-50 MG per tablet   Commonly known as:  SENOKOT-S;PERICOLACE        Dose:  1-2 tablet   Take 1-2 tablets by mouth 2 times daily   Quantity:  40 tablet   Refills:  0         CONTINUE these medicines which have NOT CHANGED        Dose / Directions    GNP PRENATAL VITAMINS 28-0.8 MG Tabs   Used for:  Supervision of other normal pregnancy, antepartum        Dose:  1 tablet   Take 1 tablet by mouth daily   Quantity:  100 tablet    Refills:  3         STOP taking     aspirin 81 MG tablet                Where to get your medicines      These medications were sent to [facility] [place], [place] - [address]  [address] [facility] [address], [facility] [address]     Phone:  [phone]     acetaminophen 325 MG tablet    ferrous sulfate 325 (65 FE) MG tablet    ibuprofen 600 MG tablet    senna-docusate 8.6-50 MG per tablet         Some of these will need a paper prescription and others can be bought over the counter. Ask your nurse if you have questions.     Bring a paper prescription for each of these medications     oxyCODONE IR 5 MG tablet                Protect others around you: Learn how to safely use, store and throw away your medicines at www.disposemymeds.org.             Medication List: This is a list of all your medications and when to take them. Check marks below indicate your daily home schedule. Keep this list as a reference.      Medications           Morning Afternoon Evening Bedtime As Needed    acetaminophen 325 MG tablet   Commonly known as:  TYLENOL   Take 2 tablets (650 mg) by mouth every 4 hours as needed for other (surgical pain)   Last time this was given:  975 mg on 11/16/2017 11:06 AM                                ferrous sulfate 325 (65 FE) MG tablet   Commonly known as:  IRON   Take 1 tablet (325 mg) by mouth daily (with breakfast)                                GNP PRENATAL VITAMINS 28-0.8 MG Tabs   Take 1 tablet by mouth daily                                ibuprofen 600 MG tablet   Commonly known as:  ADVIL/MOTRIN   Take 1 tablet (600 mg) by mouth every 6 hours as needed for moderate pain   Last time this was given:  800 mg on 11/16/2017  6:28 AM                                oxyCODONE IR 5 MG tablet   Commonly known as:  ROXICODONE   Take 1-2 tablets (5-10 mg) by mouth every 4 hours as needed for moderate to severe pain   Last time this was given:  10 mg on 11/16/2017  9:27 AM                                 senna-docusate 8.6-50 MG per tablet   Commonly known as:  SENOKOT-S;PERICOLACE   Take 1-2 tablets by mouth 2 times daily   Last time this was given:  2 tablets on 11/16/2017  7:55 AM

## 2017-11-12 NOTE — H&P
L&D History and Physical   2017  Jean-Paul Jimenez  [id number]    Entire interview and exam was conducted with [employer] ipad     Admission Date: 2017   PCP: [facility]     HPI: Jean-Paul Jimenez is a 27 year old  at 40w3d by 26w5d ultrasound who presents today with complaints of leakage of fluid.  She reports that at about 0200 she had a gush of clear/white fluid.  She states that she was having contractions prior to the gush of fluid, but the contractions have decreased in intensity and frequency since the fluid leakage.  She denies any vaginal bleeding.  She states that her baby is moving, but less than normal.  She denies any headache, changes in vision, right upper quadrant pain, chest pain, chest pressure, shortness of breath, nausea, vomiting, diarrhea, or constipation.      Pregnancy notable for:  - history of  section under general anesthesia due to HELLP remote from delivery  -history of HELLP syndrome  -Rh negative  -gestational thrombocytopenia, Plt 135 on 17    OBHX:   Obstetric History       T1      L1     SAB0   TAB0   Ectopic0   Multiple0   Live Births1       # Outcome Date GA Lbr Maurizio/2nd Weight Sex Delivery Anes PTL Lv   2 Current            1 Term         HEYDI          MedicalHX: history of pre-eclampsia with severe features, gestational thrombocytopenia  SurgicalHX:     Medications:     No current facility-administered medications on file prior to encounter.   Current Outpatient Prescriptions on File Prior to Encounter:  Prenatal Vit-Fe Fumarate-FA (GNP PRENATAL VITAMINS) 28-0.8 MG TABS Take 1 tablet by mouth daily   ASA    Allergies:   No Known Allergies    SocialHX:   ROS: 10-point ROS negative except as in HPI     Physical Exam:  Vitals:    17 0249   BP: 112/67     GEN: resting comfortably in bed, no acute distress  CV: regular rate and rhythm, no murmurs  PULM: clear to auscultation bilaterally, no increased work of  breathing, no cough/wheeze   ABD: soft, gravid, non-tender, non-distended  EXT: trace edema, non- tender to palpation  CVX: closed/ long/ high/ high/ medium/ posterior at 0330  Presentation: cephalic by bedside ultrasound  EFW: 7.5  lbs    NST:  FHT: baseline 135, moderate variability, + accels, no decels  TOCO: 1 contraction/ 10 minutes in a background of uterine irritability     Ultrasounds:  9/6/17  GENERAL EVALUATION  ---------------------------------------------------------------------------------------------------------  Cardiac activity: present.  bpm.  Fetal movements: visualized.  Presentation: cephalic.  Placenta:  Placental site: anterior.  Umbilical cord: 3 vessel cord.  Amniotic fluid: Amount of AF: normal amount. MVP 5.7 cm. RENY 17.4 cm. Q1 4.4 cm, Q2 5.7 cm, Q3 4.7 cm, Q4 2.6 cm.    IMPRESSION  ---------------------------------------------------------------------------------------------------------  1) Intrauterine pregnancy at 30+6 weeks gestational age.  2) None of the anomalies commonly detected by ultrasound were evident in the detailed fetal anatomic survey described above.  3) Growth parameters and estimated fetal weight were consistent with an appropriate for gestation age pattern of growth.  4) The amniotic fluid volume appeared normal.        Lab Results   Component Value Date    ABO A 04/17/2017    RH Neg 04/17/2017    AS Neg 08/08/2017    HEPBANG non-reactive 04/17/2017    CHPCRT  05/11/2017     Negative   Negative for C. trachomatis rRNA by transcription mediated amplification.   A negative result by transcription mediated amplification does not preclude the   presence of C. trachomatis infection because results are dependent on proper   and adequate collection, absence of inhibitors, and sufficient rRNA to be   detected.      GCPCRT  05/11/2017     Negative   Negative for N. gonorrhoeae rRNA by transcription mediated amplification.   A negative result by transcription mediated  amplification does not preclude the   presence of N. gonorrhoeae infection because results are dependent on proper   and adequate collection, absence of inhibitors, and sufficient rRNA to be   detected.      TREPAB non-reactive 2017    HGB 12.3 10/17/2017       GBS Status:   Lab Results   Component Value Date    GBS Negative 10/17/2017       Lab Results   Component Value Date    PAP NIL 2017       A/P:Jean-Paul Jimenez is a 27 year old  at 40w3d by 26w5d ultrasound admitted with spontaneous rupture of membranes.    Spontaneous rupture of membranes:  Admit for SROM. Amnisure positive.  Expectant management at this point.    -start IV now  -CBC, RPR, Type and screen    History of  section:   The patient strongly desires TOLAC.  Patient had previous discussed the risks of TOLAC, failed TOLAC requiring , and  on 17 with Dr. Clement. I reviewed the risks of TOLAC including the risk of uterine rupture.  Discussed that if uterine rupture were to occur emergency  section is required and uterine rupture can be life threatening to patient and infant.  The patient voices understanding and would like to proceed with TOLAC.  We also discussed that if her contractions do not increase in intensity and frequency or if her cervix does not make change with frequent contractions, then we would have to have a discussion and make a determination if starting pitocin augmentation would be the best management choice.  Explained the slightly increased risk of uterine rupture with pitocin augmentation.  Plan to monitor for 6-8 hours after rupture.  Consider pitocin augmentation at [phone] if no change.      FHT: Category 1, reactive    Pain management: Discussed the options of fentanyl and nitrous oxide for pain control in labor.  Explained that if her platelets are too low that an epidural for pain control would not be an option.     History of HELLP syndrome:  Patient on ASA 81mg in  pregnancy  Normal blood pressure throughout pregnancy    Rh negative:  -plan for rhogam evaluation postpartum    Gestational thrombocytopenia:  -Plt 135 on 8/30/17  -CBC on admission     Philomena Lua  11/12/2017 2:48 AM

## 2017-11-12 NOTE — IP AVS SNAPSHOT
[facility]    [address] [address]    Phone:  [phone]                                       After Visit Summary   11/12/2017    Jean-Paul Jimenez    MRN: [id number]           After Visit Summary Signature Page     I have received my discharge instructions, and my questions have been answered. I have discussed any challenges I see with this plan with the nurse or doctor.    ..........................................................................................................................................  Patient/Patient Representative Signature      ..........................................................................................................................................  Patient Representative Print Name and Relationship to Patient    ..................................................               ................................................  Date                                            Time    ..........................................................................................................................................  Reviewed by Signature/Title    ...................................................              ..............................................  Date                                                            Time

## 2017-11-13 ENCOUNTER — ANESTHESIA EVENT (OUTPATIENT)
Dept: OBGYN | Facility: CLINIC | Age: 27
End: 2017-11-13
Payer: COMMERCIAL

## 2017-11-13 ENCOUNTER — OFFICE VISIT (OUTPATIENT)
Dept: INTERPRETER SERVICES | Facility: CLINIC | Age: 27
End: 2017-11-13

## 2017-11-13 ENCOUNTER — ANESTHESIA (OUTPATIENT)
Dept: OBGYN | Facility: CLINIC | Age: 27
End: 2017-11-13
Payer: COMMERCIAL

## 2017-11-13 PROBLEM — Z98.891 S/P CESAREAN SECTION: Status: ACTIVE | Noted: 2017-11-13

## 2017-11-13 LAB
ABO + RH BLD: NORMAL
ABO + RH BLD: NORMAL
BASOPHILS # BLD AUTO: 0 10E9/L (ref 0–0.2)
BASOPHILS NFR BLD AUTO: 0.2 %
BLD GP AB SCN SERPL QL: NORMAL
BLOOD BANK CMNT PATIENT-IMP: NORMAL
DIFFERENTIAL METHOD BLD: ABNORMAL
EOSINOPHIL # BLD AUTO: 0 10E9/L (ref 0–0.7)
EOSINOPHIL NFR BLD AUTO: 0 %
ERYTHROCYTE [DISTWIDTH] IN BLOOD BY AUTOMATED COUNT: 13.2 % (ref 10–15)
HCT VFR BLD AUTO: 35.6 % (ref 35–47)
HGB BLD-MCNC: 12 G/DL (ref 11.7–15.7)
IMM GRANULOCYTES # BLD: 0.1 10E9/L (ref 0–0.4)
IMM GRANULOCYTES NFR BLD: 0.5 %
LYMPHOCYTES # BLD AUTO: 2 10E9/L (ref 0.8–5.3)
LYMPHOCYTES NFR BLD AUTO: 15.4 %
MCH RBC QN AUTO: 32 PG (ref 26.5–33)
MCHC RBC AUTO-ENTMCNC: 33.7 G/DL (ref 31.5–36.5)
MCV RBC AUTO: 95 FL (ref 78–100)
MONOCYTES # BLD AUTO: 0.5 10E9/L (ref 0–1.3)
MONOCYTES NFR BLD AUTO: 3.9 %
NEUTROPHILS # BLD AUTO: 10.3 10E9/L (ref 1.6–8.3)
NEUTROPHILS NFR BLD AUTO: 80 %
NRBC # BLD AUTO: 0 10*3/UL
NRBC BLD AUTO-RTO: 0 /100
PLATELET # BLD AUTO: 141 10E9/L (ref 150–450)
RBC # BLD AUTO: 3.75 10E12/L (ref 3.8–5.2)
SPECIMEN EXP DATE BLD: NORMAL
WBC # BLD AUTO: 12.8 10E9/L (ref 4–11)

## 2017-11-13 PROCEDURE — 86850 RBC ANTIBODY SCREEN: CPT | Performed by: STUDENT IN AN ORGANIZED HEALTH CARE EDUCATION/TRAINING PROGRAM

## 2017-11-13 PROCEDURE — 86901 BLOOD TYPING SEROLOGIC RH(D): CPT | Performed by: STUDENT IN AN ORGANIZED HEALTH CARE EDUCATION/TRAINING PROGRAM

## 2017-11-13 PROCEDURE — 37000008 ZZH ANESTHESIA TECHNICAL FEE, 1ST 30 MIN: Performed by: OBSTETRICS & GYNECOLOGY

## 2017-11-13 PROCEDURE — 86900 BLOOD TYPING SEROLOGIC ABO: CPT | Performed by: STUDENT IN AN ORGANIZED HEALTH CARE EDUCATION/TRAINING PROGRAM

## 2017-11-13 PROCEDURE — 25000132 ZZH RX MED GY IP 250 OP 250 PS 637: Performed by: OBSTETRICS & GYNECOLOGY

## 2017-11-13 PROCEDURE — 25000125 ZZHC RX 250: Performed by: ANESTHESIOLOGY

## 2017-11-13 PROCEDURE — 36000057 ZZH SURGERY LEVEL 3 1ST 30 MIN - UMMC: Performed by: OBSTETRICS & GYNECOLOGY

## 2017-11-13 PROCEDURE — C1765 ADHESION BARRIER: HCPCS | Performed by: OBSTETRICS & GYNECOLOGY

## 2017-11-13 PROCEDURE — 36415 COLL VENOUS BLD VENIPUNCTURE: CPT | Performed by: STUDENT IN AN ORGANIZED HEALTH CARE EDUCATION/TRAINING PROGRAM

## 2017-11-13 PROCEDURE — 12000032 ZZH R&B OB CRITICAL UMMC

## 2017-11-13 PROCEDURE — 25000125 ZZHC RX 250: Performed by: NURSE ANESTHETIST, CERTIFIED REGISTERED

## 2017-11-13 PROCEDURE — 71000013 ZZH RECOVERY PHASE 1 LEVEL 1 EA ADDTL HR: Performed by: OBSTETRICS & GYNECOLOGY

## 2017-11-13 PROCEDURE — 71000012 ZZH RECOVERY PHASE 1 LEVEL 1 FIRST HR: Performed by: OBSTETRICS & GYNECOLOGY

## 2017-11-13 PROCEDURE — 25000128 H RX IP 250 OP 636: Performed by: OBSTETRICS & GYNECOLOGY

## 2017-11-13 PROCEDURE — T1013 SIGN LANG/ORAL INTERPRETER: HCPCS | Mod: U3

## 2017-11-13 PROCEDURE — 40000977 ZZH STATISTIC ATTENDANCE AT DELIVERY

## 2017-11-13 PROCEDURE — 25000132 ZZH RX MED GY IP 250 OP 250 PS 637: Performed by: STUDENT IN AN ORGANIZED HEALTH CARE EDUCATION/TRAINING PROGRAM

## 2017-11-13 PROCEDURE — 37000009 ZZH ANESTHESIA TECHNICAL FEE, EACH ADDTL 15 MIN: Performed by: OBSTETRICS & GYNECOLOGY

## 2017-11-13 PROCEDURE — 0DNW0ZZ RELEASE PERITONEUM, OPEN APPROACH: ICD-10-PCS | Performed by: OBSTETRICS & GYNECOLOGY

## 2017-11-13 PROCEDURE — 25000125 ZZHC RX 250: Performed by: OBSTETRICS & GYNECOLOGY

## 2017-11-13 PROCEDURE — 27110028 ZZH OR GENERAL SUPPLY NON-STERILE: Performed by: OBSTETRICS & GYNECOLOGY

## 2017-11-13 PROCEDURE — 25000128 H RX IP 250 OP 636: Performed by: STUDENT IN AN ORGANIZED HEALTH CARE EDUCATION/TRAINING PROGRAM

## 2017-11-13 PROCEDURE — 85025 COMPLETE CBC W/AUTO DIFF WBC: CPT | Performed by: STUDENT IN AN ORGANIZED HEALTH CARE EDUCATION/TRAINING PROGRAM

## 2017-11-13 PROCEDURE — 36000059 ZZH SURGERY LEVEL 3 EA 15 ADDTL MIN UMMC: Performed by: OBSTETRICS & GYNECOLOGY

## 2017-11-13 PROCEDURE — 40000170 ZZH STATISTIC PRE-PROCEDURE ASSESSMENT II: Performed by: OBSTETRICS & GYNECOLOGY

## 2017-11-13 PROCEDURE — 27210794 ZZH OR GENERAL SUPPLY STERILE: Performed by: OBSTETRICS & GYNECOLOGY

## 2017-11-13 PROCEDURE — 25000128 H RX IP 250 OP 636: Performed by: NURSE ANESTHETIST, CERTIFIED REGISTERED

## 2017-11-13 RX ORDER — BISACODYL 10 MG
10 SUPPOSITORY, RECTAL RECTAL DAILY PRN
Status: DISCONTINUED | OUTPATIENT
Start: 2017-11-15 | End: 2017-11-16 | Stop reason: HOSPADM

## 2017-11-13 RX ORDER — CEFAZOLIN SODIUM 2 G/100ML
2 INJECTION, SOLUTION INTRAVENOUS
Status: COMPLETED | OUTPATIENT
Start: 2017-11-13 | End: 2017-11-13

## 2017-11-13 RX ORDER — MORPHINE SULFATE 1 MG/ML
INJECTION, SOLUTION EPIDURAL; INTRATHECAL; INTRAVENOUS PRN
Status: DISCONTINUED | OUTPATIENT
Start: 2017-11-13 | End: 2017-11-13

## 2017-11-13 RX ORDER — OXYTOCIN/0.9 % SODIUM CHLORIDE 30/500 ML
100 PLASTIC BAG, INJECTION (ML) INTRAVENOUS CONTINUOUS
Status: DISCONTINUED | OUTPATIENT
Start: 2017-11-13 | End: 2017-11-16 | Stop reason: HOSPADM

## 2017-11-13 RX ORDER — HYDROCORTISONE 2.5 %
CREAM (GRAM) TOPICAL 3 TIMES DAILY PRN
Status: DISCONTINUED | OUTPATIENT
Start: 2017-11-13 | End: 2017-11-16 | Stop reason: HOSPADM

## 2017-11-13 RX ORDER — SODIUM CHLORIDE, SODIUM LACTATE, POTASSIUM CHLORIDE, CALCIUM CHLORIDE 600; 310; 30; 20 MG/100ML; MG/100ML; MG/100ML; MG/100ML
INJECTION, SOLUTION INTRAVENOUS CONTINUOUS
Status: DISCONTINUED | OUTPATIENT
Start: 2017-11-13 | End: 2017-11-13 | Stop reason: HOSPADM

## 2017-11-13 RX ORDER — BUPIVACAINE HYDROCHLORIDE 7.5 MG/ML
INJECTION, SOLUTION INTRASPINAL PRN
Status: DISCONTINUED | OUTPATIENT
Start: 2017-11-13 | End: 2017-11-13

## 2017-11-13 RX ORDER — IBUPROFEN 400 MG/1
400-800 TABLET, FILM COATED ORAL EVERY 6 HOURS PRN
Status: DISCONTINUED | OUTPATIENT
Start: 2017-11-13 | End: 2017-11-16 | Stop reason: HOSPADM

## 2017-11-13 RX ORDER — OXYCODONE HYDROCHLORIDE 5 MG/1
5-10 TABLET ORAL
Status: DISCONTINUED | OUTPATIENT
Start: 2017-11-13 | End: 2017-11-16 | Stop reason: HOSPADM

## 2017-11-13 RX ORDER — ONDANSETRON 2 MG/ML
INJECTION INTRAMUSCULAR; INTRAVENOUS PRN
Status: DISCONTINUED | OUTPATIENT
Start: 2017-11-13 | End: 2017-11-13

## 2017-11-13 RX ORDER — LIDOCAINE 40 MG/G
CREAM TOPICAL
Status: DISCONTINUED | OUTPATIENT
Start: 2017-11-13 | End: 2017-11-16 | Stop reason: HOSPADM

## 2017-11-13 RX ORDER — NALOXONE HYDROCHLORIDE 0.4 MG/ML
.1-.4 INJECTION, SOLUTION INTRAMUSCULAR; INTRAVENOUS; SUBCUTANEOUS
Status: DISCONTINUED | OUTPATIENT
Start: 2017-11-13 | End: 2017-11-16 | Stop reason: HOSPADM

## 2017-11-13 RX ORDER — ONDANSETRON 4 MG/1
4 TABLET, ORALLY DISINTEGRATING ORAL EVERY 30 MIN PRN
Status: DISCONTINUED | OUTPATIENT
Start: 2017-11-13 | End: 2017-11-13 | Stop reason: HOSPADM

## 2017-11-13 RX ORDER — ONDANSETRON 2 MG/ML
4 INJECTION INTRAMUSCULAR; INTRAVENOUS EVERY 6 HOURS PRN
Status: DISCONTINUED | OUTPATIENT
Start: 2017-11-13 | End: 2017-11-16 | Stop reason: HOSPADM

## 2017-11-13 RX ORDER — METHYLERGONOVINE MALEATE 0.2 MG/ML
200 INJECTION INTRAVENOUS
Status: DISCONTINUED | OUTPATIENT
Start: 2017-11-13 | End: 2017-11-16 | Stop reason: HOSPADM

## 2017-11-13 RX ORDER — DIPHENHYDRAMINE HYDROCHLORIDE 50 MG/ML
25 INJECTION INTRAMUSCULAR; INTRAVENOUS EVERY 6 HOURS PRN
Status: DISCONTINUED | OUTPATIENT
Start: 2017-11-13 | End: 2017-11-16 | Stop reason: HOSPADM

## 2017-11-13 RX ORDER — OXYTOCIN 10 [USP'U]/ML
10 INJECTION, SOLUTION INTRAMUSCULAR; INTRAVENOUS
Status: DISCONTINUED | OUTPATIENT
Start: 2017-11-13 | End: 2017-11-16 | Stop reason: HOSPADM

## 2017-11-13 RX ORDER — OXYTOCIN/0.9 % SODIUM CHLORIDE 30/500 ML
PLASTIC BAG, INJECTION (ML) INTRAVENOUS CONTINUOUS PRN
Status: DISCONTINUED | OUTPATIENT
Start: 2017-11-13 | End: 2017-11-13

## 2017-11-13 RX ORDER — KETOROLAC TROMETHAMINE 30 MG/ML
30 INJECTION, SOLUTION INTRAMUSCULAR; INTRAVENOUS EVERY 6 HOURS
Status: DISPENSED | OUTPATIENT
Start: 2017-11-13 | End: 2017-11-14

## 2017-11-13 RX ORDER — FENTANYL CITRATE 50 UG/ML
25-50 INJECTION, SOLUTION INTRAMUSCULAR; INTRAVENOUS
Status: DISCONTINUED | OUTPATIENT
Start: 2017-11-13 | End: 2017-11-13 | Stop reason: HOSPADM

## 2017-11-13 RX ORDER — MISOPROSTOL 200 UG/1
TABLET ORAL
Status: DISCONTINUED
Start: 2017-11-13 | End: 2017-11-13 | Stop reason: HOSPADM

## 2017-11-13 RX ORDER — MISOPROSTOL 200 UG/1
400 TABLET ORAL
Status: DISCONTINUED | OUTPATIENT
Start: 2017-11-13 | End: 2017-11-16 | Stop reason: HOSPADM

## 2017-11-13 RX ORDER — SIMETHICONE 80 MG
80 TABLET,CHEWABLE ORAL 4 TIMES DAILY PRN
Status: DISCONTINUED | OUTPATIENT
Start: 2017-11-13 | End: 2017-11-16 | Stop reason: HOSPADM

## 2017-11-13 RX ORDER — AMOXICILLIN 250 MG
1-2 CAPSULE ORAL 2 TIMES DAILY
Status: DISCONTINUED | OUTPATIENT
Start: 2017-11-13 | End: 2017-11-16 | Stop reason: HOSPADM

## 2017-11-13 RX ORDER — DIPHENHYDRAMINE HCL 25 MG
25 CAPSULE ORAL EVERY 6 HOURS PRN
Status: DISCONTINUED | OUTPATIENT
Start: 2017-11-13 | End: 2017-11-16 | Stop reason: HOSPADM

## 2017-11-13 RX ORDER — OXYTOCIN 10 [USP'U]/ML
INJECTION, SOLUTION INTRAMUSCULAR; INTRAVENOUS
Status: DISCONTINUED
Start: 2017-11-13 | End: 2017-11-13 | Stop reason: HOSPADM

## 2017-11-13 RX ORDER — OXYTOCIN/0.9 % SODIUM CHLORIDE 30/500 ML
340 PLASTIC BAG, INJECTION (ML) INTRAVENOUS CONTINUOUS PRN
Status: DISCONTINUED | OUTPATIENT
Start: 2017-11-13 | End: 2017-11-16 | Stop reason: HOSPADM

## 2017-11-13 RX ORDER — ONDANSETRON 2 MG/ML
4 INJECTION INTRAMUSCULAR; INTRAVENOUS EVERY 30 MIN PRN
Status: DISCONTINUED | OUTPATIENT
Start: 2017-11-13 | End: 2017-11-13 | Stop reason: HOSPADM

## 2017-11-13 RX ORDER — LIDOCAINE HYDROCHLORIDE 10 MG/ML
INJECTION, SOLUTION EPIDURAL; INFILTRATION; INTRACAUDAL; PERINEURAL
Status: DISCONTINUED
Start: 2017-11-13 | End: 2017-11-13 | Stop reason: HOSPADM

## 2017-11-13 RX ORDER — ACETAMINOPHEN 325 MG/1
975 TABLET ORAL EVERY 8 HOURS
Status: DISCONTINUED | OUTPATIENT
Start: 2017-11-13 | End: 2017-11-16 | Stop reason: HOSPADM

## 2017-11-13 RX ORDER — HYDROMORPHONE HYDROCHLORIDE 1 MG/ML
.3-.5 INJECTION, SOLUTION INTRAMUSCULAR; INTRAVENOUS; SUBCUTANEOUS EVERY 30 MIN PRN
Status: DISCONTINUED | OUTPATIENT
Start: 2017-11-13 | End: 2017-11-16 | Stop reason: HOSPADM

## 2017-11-13 RX ORDER — CEFAZOLIN SODIUM 1 G/3ML
1 INJECTION, POWDER, FOR SOLUTION INTRAMUSCULAR; INTRAVENOUS SEE ADMIN INSTRUCTIONS
Status: DISCONTINUED | OUTPATIENT
Start: 2017-11-13 | End: 2017-11-13 | Stop reason: HOSPADM

## 2017-11-13 RX ORDER — DEXTROSE, SODIUM CHLORIDE, SODIUM LACTATE, POTASSIUM CHLORIDE, AND CALCIUM CHLORIDE 5; .6; .31; .03; .02 G/100ML; G/100ML; G/100ML; G/100ML; G/100ML
INJECTION, SOLUTION INTRAVENOUS CONTINUOUS
Status: DISCONTINUED | OUTPATIENT
Start: 2017-11-13 | End: 2017-11-16 | Stop reason: HOSPADM

## 2017-11-13 RX ORDER — CARBOPROST TROMETHAMINE 250 UG/ML
250 INJECTION, SOLUTION INTRAMUSCULAR
Status: DISCONTINUED | OUTPATIENT
Start: 2017-11-13 | End: 2017-11-16 | Stop reason: HOSPADM

## 2017-11-13 RX ORDER — CITRIC ACID/SODIUM CITRATE 334-500MG
30 SOLUTION, ORAL ORAL
Status: COMPLETED | OUTPATIENT
Start: 2017-11-13 | End: 2017-11-13

## 2017-11-13 RX ORDER — ACETAMINOPHEN 325 MG/1
650 TABLET ORAL EVERY 4 HOURS PRN
Status: DISCONTINUED | OUTPATIENT
Start: 2017-11-16 | End: 2017-11-16 | Stop reason: HOSPADM

## 2017-11-13 RX ORDER — LANOLIN 100 %
OINTMENT (GRAM) TOPICAL
Status: DISCONTINUED | OUTPATIENT
Start: 2017-11-13 | End: 2017-11-16 | Stop reason: HOSPADM

## 2017-11-13 RX ADMIN — Medication 0.2 MG: at 02:12

## 2017-11-13 RX ADMIN — KETOROLAC TROMETHAMINE 30 MG: 30 INJECTION, SOLUTION INTRAMUSCULAR at 14:56

## 2017-11-13 RX ADMIN — OXYTOCIN-SODIUM CHLORIDE 0.9% IV SOLN 30 UNIT/500ML 600 ML/HR: 30-0.9/5 SOLUTION at 02:53

## 2017-11-13 RX ADMIN — SODIUM CHLORIDE, POTASSIUM CHLORIDE, SODIUM LACTATE AND CALCIUM CHLORIDE: 600; 310; 30; 20 INJECTION, SOLUTION INTRAVENOUS at 02:05

## 2017-11-13 RX ADMIN — FENTANYL CITRATE 25 MCG: 50 INJECTION INTRAMUSCULAR; INTRAVENOUS at 02:33

## 2017-11-13 RX ADMIN — FENTANYL CITRATE 100 MCG: 50 INJECTION INTRAMUSCULAR; INTRAVENOUS at 00:03

## 2017-11-13 RX ADMIN — KETOROLAC TROMETHAMINE 30 MG: 30 INJECTION, SOLUTION INTRAMUSCULAR at 22:18

## 2017-11-13 RX ADMIN — OXYCODONE HYDROCHLORIDE 10 MG: 5 TABLET ORAL at 14:56

## 2017-11-13 RX ADMIN — OXYCODONE HYDROCHLORIDE AND ACETAMINOPHEN 1 TABLET: 5; 325 TABLET ORAL at 06:41

## 2017-11-13 RX ADMIN — SENNOSIDES AND DOCUSATE SODIUM 1 TABLET: 8.6; 5 TABLET ORAL at 09:46

## 2017-11-13 RX ADMIN — SENNOSIDES AND DOCUSATE SODIUM 1 TABLET: 8.6; 5 TABLET ORAL at 22:18

## 2017-11-13 RX ADMIN — FENTANYL CITRATE 25 MCG: 50 INJECTION INTRAMUSCULAR; INTRAVENOUS at 02:29

## 2017-11-13 RX ADMIN — FENTANYL CITRATE 25 MCG: 50 INJECTION INTRAMUSCULAR; INTRAVENOUS at 03:08

## 2017-11-13 RX ADMIN — ACETAMINOPHEN 650 MG: 325 TABLET, FILM COATED ORAL at 06:41

## 2017-11-13 RX ADMIN — SODIUM CHLORIDE, POTASSIUM CHLORIDE, SODIUM LACTATE AND CALCIUM CHLORIDE 1000 ML: 600; 310; 30; 20 INJECTION, SOLUTION INTRAVENOUS at 01:45

## 2017-11-13 RX ADMIN — SODIUM CHLORIDE, SODIUM LACTATE, POTASSIUM CHLORIDE, CALCIUM CHLORIDE AND DEXTROSE MONOHYDRATE: 5; 600; 310; 30; 20 INJECTION, SOLUTION INTRAVENOUS at 11:45

## 2017-11-13 RX ADMIN — DIPHENHYDRAMINE HYDROCHLORIDE 50 MG: 50 INJECTION, SOLUTION INTRAMUSCULAR; INTRAVENOUS at 12:01

## 2017-11-13 RX ADMIN — PHENYLEPHRINE HYDROCHLORIDE 100 MCG: 10 INJECTION, SOLUTION INTRAMUSCULAR; INTRAVENOUS; SUBCUTANEOUS at 02:39

## 2017-11-13 RX ADMIN — OXYCODONE HYDROCHLORIDE 10 MG: 5 TABLET ORAL at 09:46

## 2017-11-13 RX ADMIN — BUPIVACAINE HYDROCHLORIDE IN DEXTROSE 1.6 ML: 7.5 INJECTION, SOLUTION SUBARACHNOID at 02:12

## 2017-11-13 RX ADMIN — OXYTOCIN-SODIUM CHLORIDE 0.9% IV SOLN 30 UNIT/500ML 100 ML/HR: 30-0.9/5 SOLUTION at 05:50

## 2017-11-13 RX ADMIN — AZITHROMYCIN MONOHYDRATE 500 MG: 500 INJECTION, POWDER, LYOPHILIZED, FOR SOLUTION INTRAVENOUS at 02:21

## 2017-11-13 RX ADMIN — SODIUM CITRATE AND CITRIC ACID MONOHYDRATE 30 ML: 500; 334 SOLUTION ORAL at 01:43

## 2017-11-13 RX ADMIN — PHENYLEPHRINE HYDROCHLORIDE 0.5 MCG/KG/MIN: 10 INJECTION, SOLUTION INTRAMUSCULAR; INTRAVENOUS; SUBCUTANEOUS at 02:18

## 2017-11-13 RX ADMIN — ACETAMINOPHEN 975 MG: 325 TABLET, FILM COATED ORAL at 14:56

## 2017-11-13 RX ADMIN — ONDANSETRON 4 MG: 2 INJECTION INTRAMUSCULAR; INTRAVENOUS at 02:35

## 2017-11-13 RX ADMIN — OXYCODONE HYDROCHLORIDE 10 MG: 5 TABLET ORAL at 21:31

## 2017-11-13 RX ADMIN — FENTANYL CITRATE 25 MCG: 50 INJECTION INTRAMUSCULAR; INTRAVENOUS at 03:20

## 2017-11-13 RX ADMIN — CEFAZOLIN SODIUM 2 G: 2 INJECTION, SOLUTION INTRAVENOUS at 02:12

## 2017-11-13 NOTE — PLAN OF CARE
Problem: Patient Care Overview  Goal: Plan of Care/Patient Progress Review  Outcome: Improving  Pt. Settled into room for one hour before shift change.  Initial VS and fundal checks WNL.  Pt. Requested pain medication for incisional pain.  See eMAR. Rodrigues patent and draining adequate amounts of urine.  Second bag of pitocin running.  No concerns at this time.  Continue with plan of care.

## 2017-11-13 NOTE — PROGRESS NOTES
[facility]  MATERNAL CHILD HEALTH   SOCIAL WORK PROGRESS NOTE      DATA:   Received nursing referral to meet with patient to address question about how to add baby to insurance.     INTERVENTION:   Brief visit.  Patient is sleeping soundly but  present in the room and speaks English well.  Instructed [place] to contact [place] [facility] - provide them baby's name and date of birth.  [place] [facility] will send them paperwork to be completed and then baby will be assigned a [facility] [facility] ID #.      ASSESSMENT:   No other social work needs or concerns are identified.     PLAN:   No further SW intervention anticipated.  Please refer again if needs/concerns arise prior to discharge.

## 2017-11-13 NOTE — PLAN OF CARE
Problem: Patient Care Overview  Goal: Individualization & Mutuality  Outcome: No Change  VSS. Denies bleeding. Leaking scant amount of clear fluid. Category 1 fetal tracing. Ctx becoming stronger per pt report. Pain well controlled with ambulation, distraction and IV Fentanyl. Some nausea this shift, resolved with IV zofran. Plan is to progress toward .

## 2017-11-13 NOTE — PROVIDER NOTIFICATION
Called blood bank regarding patient blood type. Results review from  shows blood type A positive. Patient's care everywhere tab shows multiple results of A negative from both [facility] and [facility].   Blood bank is going to follow up on this discrepancy and call back to the [facility] as soon as possible. Will continue to monitor.

## 2017-11-13 NOTE — PROGRESS NOTES
"S:  Patient comfortable.  rec'd 100  Mcg of fentanyl about a 1-2 hrs ago.   Starting to feel ctx's stronger again now.     OBJECTIVE:  Vitals:    17 0909 17 1133 17 1503 17 1912   BP: 119/72  102/64 110/75   Resp:    20   Temp: 97.4  F (36.3  C)  98.1  F (36.7  C) 98.5  F (36.9  C)   TempSrc: Oral  Oral Oral   Height:  1.626 m (5' 4\")        Pitocin at 4 mu/min    [place]: ctx's q 2-2.5 min    EFM: reassuring with BL at 135, no large accels but moderate variability and no decels    Cervix deferred.     ASSESSMENT:   INTRAUTERINE PREGNANCY at 40w3d  PROM with ctx's but not in labor yet  Now ruptured x almost 18 hrs and no cervical change.   TOLAC     PLAN:   Will check cervix again in 1 hr which will be 4 hrs from last check.    wants patient to go with a repeat  section and has been pressuring her now for several hours.  He wants to avoid any complications.  Patient is reluctant and wants to keep attempting TOLAC.    Will repeat cervix check in one hr.   patient seen with interpretor.    There was a lot of conversation between  and patient that was not interpreted to me but  said the essence of it was he told her we are all waiting for her to say go ahead with  section.  I stated that at this point in time both patient and baby are stable and it is ok for patient to continue to try.        Urmila Garcia MD      "

## 2017-11-13 NOTE — BRIEF OP NOTE
[facility]   Brief Operative Note     Surgery Date: 2017    Surgeon:  Urmila Garcia MD     Assistants:  Sanaz Stallworth MD PGY-2    Pre-op Diagnosis:    - Intrauterine pregnancy at 40w4d  - PROM   - History of  section x1  - Gestational thrombocytopenia  - H/o HELLP syndrome in prior pregnancy    Post-op Diagnosis:    - Same   - Liveborn female infant     Procedure:  Repeat low-transverse  section with double layer uterine closure via Pfannenstiel skin incision, lysis of adhesions    Anesthesia: Spinal  EBL:  800 mL  IVF:  1000 mL crystalloid  UOP:  250 mL clear urine at the end of the case  Drains: Rodrigues Catheter     Specimens:  Cord blood, cord segment, placenta    Complications: None apparent    Findings:   1. Moderate anterior abdominal wall adhesions out of proportion for a second CS and minimal intraabdominal adhesions.   2. Uterine window  3. Clear amniotic fluid  4. Liveborn female infant in [place] presentation. Apgars 8 at 1 minute & 9 at 5 minutes. Weight 7lb 8oz.  5. Normal uterus, fallopian tubes, and ovaries.     Disposition:  Transferred in stable condition to [facility] Stallworth MD  Ob/Gyn, PGY-2

## 2017-11-13 NOTE — PROGRESS NOTES
Labor Progress Note      S: Painful contractions continue. Continues to decline pain medications. Patient desires to proceed with TOLAC. No questions/concerns at this time.    O:   17 1503 17 1912   BP: 102/64 110/75   Resp:  20   Temp: 98.1  F (36.7  C) 98.5  F (36.9  C)   TempSrc: Oral Oral   SpO2:     Height:       Cervix: 1/90/high per Dr. Garcia  Membranes: PROM @ 0200    FHR: Baseline 135-140, variability moderate, no accels, no decels  [place]: contractions q2-3min    A/P:  Jean-Paul Jimenez is a 27 year old  at 40w3d here for PROM, just started making minimal cervical change.    #TOLAC: PROM x15hrs now, has had good ctx pattern for a few hrs with minimal cervical change  - Continue to titrate pitocin, currently at 4mU/min  - Have discussed RLTCS available if she decides, she continues to desire TOLAC  - Next SVE in ~4hrs or with maternal or fetal indications    #H/o HELLP syndrome in prior pregnancy:   - gTCP now w/ plt @ 135  - repeat plt prior to epidural or CS    #FWB:  - Cat I, not reactive  - Continuous fetal monitoring  - GBS neg, EFW 7.5#, vtx by BSUS, placenta anterior    Sanaz Stallworth MD  OB-GYN PGY-2  17 8:37 PM

## 2017-11-13 NOTE — CONSULTS
Transfusion medicine consult for weak or partial Rh(D):    This patient s Rh(D) status cannot be serologically determined by the Blood Bank, and is considered to be of  weak or partial Rh(D) type. Molecular testing may be able to accurately determine the patient s Rh(D) type. There are several reasons to perform molecular testing on this patient, including conservation of the supply of Rh-negative blood for true D-negative recipients, avoiding giving RhoGam unnecessarily, and elimination of false negative felisha tests early in pregnancy. Also, a partial D type phenotype in a pregnant female could lead to a higher risk for hemolytic disease of the fetus and .     Some weak/partial Rh(D) patients are known to be capable of producing allo-anti-D.  The Blood Bank will provide Rh(D) negative blood to the patient until a final determination of the Rh(D) type can be made. (If patient is a potential donor, we recommend that this molecular testing be performed on this patient.) Consent for genetic testing will need to be obtained from the patient.  This test may not be covered by insurance.  Please discuss with patient.  The patient can contact the [employer] [facility] ([phone]) for assistance.    Information on how to request the testing:  Test to request:  Genotype Partial RhD  Specimen requirements:  7-10 mL EDTA (note: if patient has been transfused, wait 24 hours before collecting)   Testing lab:   [facility]  Result reporting:  Final report is scanned into patient s electronic medical record  Use of test results:  Allow Blood Bank to determine appropriate Rh type of blood for transfusion purposes    If you have any questions, or would like to discuss this further, please do not hesitate to contact the Transfusion Medicine Service.    Renato German MD  Blood Bank Fellow  [phone] pager

## 2017-11-13 NOTE — PLAN OF CARE
Data: Jean-Paul Jimenez transferred to [id number] via zoom cart at 0600. Baby transferred via parent's arms.  Action: Receiving unit notified of transfer: Yes. Patient and family notified of room change. Report given to Daren HORVATH at 0545. Belongings sent to receiving unit. Accompanied by Registered Nurse. Oriented patient to surroundings. Call light within reach. ID bands double-checked with receiving RN.  Response: Patient tolerated transfer and is stable.

## 2017-11-13 NOTE — ANESTHESIA PROCEDURE NOTES
Spinal/LP Procedure Note    Spinal Block  Staff:     Anesthesiologist:  MARY MARCELO  Location: OB and In OR BEFORE Induction  Procedure Start/Stop Times:      11/13/2017 1:56 AM    patient identified, IV checked, site marked, risks and benefits discussed, informed consent, monitors and equipment checked, pre-op evaluation, at physician/surgeon's request and post-op pain management      Correct Patient: Yes      Correct Position: Yes      Correct Site: Yes      Correct Procedure: Yes      Correct Laterality:  Yes    Site Marked:  Yes  Procedure:     Procedure:  Intrathecal    ASA:  2    Diagnosis:  Labor    Position:  Sitting    Sterile Prep: chloraprep      Insertion site:  L2-3    Approach:  Midline    Needle Type:  Dyllan    Needle gauge (G):  25    Local Skin Infiltration:  1% lidocaine    amount (ml):  2    Needle Length (in):  3.5    Introducer used: No      Attempts:  1    Redirects:  0    CSF:  Clear    Paresthesias:  No    Time injected:  02:13

## 2017-11-13 NOTE — ANESTHESIA POSTPROCEDURE EVALUATION
Patient: Jean-Paul Jimenez    Procedure(s):   - Wound Class: II-Clean Contaminated    Diagnosis:pregnancy  Diagnosis Additional Information: No value filed.    Anesthesia Type:  Spinal    Note:  Anesthesia Post Evaluation    Patient location during evaluation: PACU  Patient participation: Able to fully participate in evaluation  Level of consciousness: awake  Pain management: adequate  Airway patency: patent  Cardiovascular status: acceptable and stable  Respiratory status: acceptable and room air  Hydration status: acceptable  PONV: none     Anesthetic complications: None          Last vitals:  There were no vitals filed for this visit.      Electronically Signed By: Jesus Maravilla MD  November 13, 2017  5:55 AM

## 2017-11-13 NOTE — PLAN OF CARE
Problem: Labor (Cervical Ripen, Induct, Augment) (Adult,Obstetrics,Pediatric)  Goal: Signs and Symptoms of Listed Potential Problems Will be Absent, Minimized or Managed (Labor)  Signs and symptoms of listed potential problems will be absent, minimized or managed by discharge/transition of care (reference Labor (Cervical Ripen, Induct, Augment) (Adult,Obstetrics,Pediatric) C[facility]).   Pt's cervix is unchanged but baby is lower in the pelvis. Pt is uncomfortable with contractions and frustrated how slow labor is.  and pt are discussing with the resident to proceed with a  section.

## 2017-11-13 NOTE — ANESTHESIA PREPROCEDURE EVALUATION
Anesthesia Evaluation     . Pt has had prior anesthetic.     No history of anesthetic complications          ROS/MED HX    ENT/Pulmonary:  - neg pulmonary ROS     Neurologic:  - neg neurologic ROS     Cardiovascular:     (+) hypertension-range: H/O HELLP, ---. : . . . :. .       METS/Exercise Tolerance:     Hematologic:  - neg hematologic  ROS       Musculoskeletal:  - neg musculoskeletal ROS       GI/Hepatic:  - neg GI/hepatic ROS       Renal/Genitourinary:  - ROS Renal section negative       Endo:  - neg endo ROS       Psychiatric:  - neg psychiatric ROS       Infectious Disease:  - neg infectious disease ROS       Malignancy:      - no malignancy   Other:    - neg other ROS                 Physical Exam  Normal systems: cardiovascular and pulmonary    Airway   Mallampati: II  TM distance: >3 FB  Neck ROM: full    Dental     Cardiovascular   Rhythm and rate: regular and normal      Pulmonary    breath sounds clear to auscultation                    Anesthesia Plan      History & Physical Review      ASA Status:  2 .        Plan for Spinal   PONV prophylaxis:  Ondansetron (or other 5HT-3)       Postoperative Care  Postoperative pain management:  Multi-modal analgesia.      Consents  Anesthetic plan, risks, benefits and alternatives discussed with:  Patient.  Use of blood products discussed: No .   .        ANESTHESIA PREOP EVALUATION    NPO Status: more then 6 hours    Procedure:     HPI:     PMHx/PSHx/ROS:  PAST MEDICAL HISTORY:   Past Medical History:   Diagnosis Date     Hypertension     HELLP in previous pregnancy       PAST SURGICAL HISTORY:   Past Surgical History:   Procedure Laterality Date      SECTION         FAMILY HISTORY: No family history on file.      Past Anes Hx: No personal or family h/o anesthesia problems    Soc Hx:   Tobacco:   EtOH:    Allergies: No Known Allergies    Meds:   Prescriptions Prior to Admission   Medication Sig Dispense Refill Last Dose     aspirin 81 MG tablet Take  81 mg by mouth daily        Prenatal Vit-Fe Fumarate-FA (GNP PRENATAL VITAMINS) 28-0.8 MG TABS Take 1 tablet by mouth daily 100 tablet 3 11/11/2017 at Unknown time       No current outpatient prescriptions on file.       Physical Exam:  VS: T 97.9, P Data Unavailable, /59, R 16, SpO2 98%         BMP:  No results found for: NA   No results found for: POTASSIUM  No results found for: CHLORIDE  No results found for: RANDI  No results found for: CO2  No results found for: BUN  Lab Results   Component Value Date    CR 0.56 08/03/2017     No results found for: GLC     CBC:  Lab Results   Component Value Date    WBC 12.8 11/13/2017     Lab Results   Component Value Date    HGB 12.0 11/13/2017     Lab Results   Component Value Date    HCT 35.6 11/13/2017     Lab Results   Component Value Date     11/13/2017        Coags/Type and Screen  No results found for: INR  No results found for: PT  Type and Screen:      Jhonny Maravilla MD    11/13/2017  1:53 AM  Risks, benefits, side effects and intended purposes discussed.

## 2017-11-13 NOTE — PLAN OF CARE
Problem: Patient Care Overview  Goal: Plan of Care/Patient Progress Review  Outcome: Improving  Vss, postpartum assessments WDL. Taking tylenol and oxycodone for pain. Toradol was started at 1500 per Dr Gonzáles, 12 hours postop. Assistance given with BF. Benadryl given IV for itching. Patient was out of bed walked to bathroom, tolerated well. IV infusing, adequate urine output. Anesthesia came to assess patient need for TAP block. Continue routine post c/s care.

## 2017-11-13 NOTE — OP NOTE
[facility]  Full Operative Progress Note     Surgery Date:             2017     Surgeon:                     Urmila Garcia MD      Assistants:                 Sanaz Stallworth MD PGY-2     Pre-op Diagnosis:                     - Intrauterine pregnancy at 40w4d  - PROM   - History of  section x 1  - Failed TOLAC  - Gestational thrombocytopenia  - H/o HELLP syndrome in prior pregnancy     Post-op Diagnosis:                   - Same   - Liveborn female infant   - Pelvic adhesions      Procedure:  Repeat low-transverse  section with double layer uterine closure via Pfannenstiel skin incision, extensive lysis of adhesions    Anesthesia: Spinal  EBL:  800 mL  IVF:  1000 mL crystalloid  UOP:  250 mL clear urine at the end of the case  Drains: Rodrigues Catheter     Specimens: Cord blood, cord segment, placenta   Complications: None apparent    Indications:   Jean-Paul Jimenez is a 27 year old  at 40w4d admitted for PROM in pregnancy c/b prior  section  for HELLP syndrome with single layer uterine closure. She was offered a  section on arrival but she strongly desired to TOLAC. Contractions did not increase and she failed to make cervical change, so after a discussion was held with patient through an interpretor,  pitocin was started and titrated upwards. About 8 hours post rupture, her  tried to convince her to go with a repeat  section, but she refused. He continued to encourage her to have a  section.  After 21 hours of rupture and 14 hours of pitocin she had progressed from closed to 1cm dilation and was exhausted. She decided to proceed with  section at that time. The risks, benefits, and alternatives of  section were discussed with the patient with help from an in person , and she agreed to proceed.     Findings:   1. Moderate anterior abdominal wall adhesions out of proportion for a single previous   section and minimal intraabdominal adhesions.   2. Uterine window  3. Clear amniotic fluid  4. Liveborn female infant in KEYSHAWN presentation. Apgars 8 at 1 minute & 9 at 5 minutes. Weight 7lb 8oz.  5. Normal uterus, fallopian tubes, and ovaries.     Procedure Details:   The patient was brought to the OR, where adequate spinal anesthesia was administered.  She was placed in the dorsal supine position with a slight leftward tilt. She was prepped and draped in the usual sterile fashion. A surgical time out was performed. A pfannenstiel skin incision was made with the scalpel via elevation and excision of the prior Keloid scar which had significant blood supply. The incision was carried down to the underlying fascia with sharp and blunt dissection. The fascia was incised in the midline, and the incision was extended laterally with the Etienne scissors. The superior aspect of the fascia was grasped with the Kocher clamps and dissected off of the underlying rectus muscles with blunt and sharp dissection.  This is where there were dense adhesions.  Attention was then turned to the inferior aspect of the fascia, which was similarly dissected off of the underlying rectus muscles. The rectus muscles were  in the midline, and the peritoneum was entered bluntly, and the opening was extended by careful lysis of adhesions via sharp and blunt dissection and electrocautery. Lysis of adhesions took 20 minutes for this case. The bladder blade was placed. The vesicouterine peritoneum was incised in the midline, and the incision was extended laterally with the Metzenbaum scissors. A bladder flap was created digitally and the bladder blade was replaced. A uterine window was noted at this time. A transverse hysterotomy was made with the scalpel in the lower uterine segment above the uterine window, and the incision was extended with digital pressure. The infant was noted to be in the KEYSHAWN position, and was delivered  atraumatically. The shoulders delivered easily. Extremely tight nuchal cord was noted. After 1 minute delay, the cord was doubly clamped and cut, and the infant was handed off to the awaiting NICU staff. A segment of cord was cut and saved. The placenta was delivered with gentle traction on the umbilical cord and uterine massage. The uterus was exteriorized and cleared of all clots and debris. Uterine tone was noted to be firm with 20 units of pitocin given through the running IV and uterine massage.  The hysterotomy was closed with a running locked suture of 0 Monocryl.  The hysterotomy was then imbricated using an 0 Monocryl suture. The hysterotomy was noted to be hemostatic. The posterior cul-de-sac was cleared of all clots and debris. The uterus was returned to the abdomen. The pericolic gutters were irrigated and cleared of all clots and debris. The hysterotomy was reexamined and noted to be hemostatic. A piece of seprafilm was placed over the hysterotomy. The fascia and rectus muscles were examined and areas of oozing were controlled with electrocautery and two figure of X sutures using 3-0 Vicryl over the superior aspect of the right rects muscles. A piece of seprafilm was placed over the rectus muscles. The fascia was closed with a running 0 Vicryl suture from each corner and tied far left of midline. The subcutaneous tissue was irrigated and areas of oozing were controlled with electrocautery. The subcutaneous tissue was <2 cm in thickness, and was therefore was not closed. The skin was closed with 4-0 Monocryl and covered with steristrips, and a sterile dressing.    All sponge, needle, and instrument counts were correct. The patient tolerated the procedure well, and was transferred to recovery in stable condition. Dr. Garcia was present and scrubbed for the entirety of the procedure.     Sanaz Stallworth MD  OB GYN PGY-2  11/13/2017, 4:40 AM     I was present for the entire procedure and agree with the  details as noted above.   Urmila Garcia MD

## 2017-11-13 NOTE — PROGRESS NOTES
Labor Progress Note      S: Painful contractions, very tired.  O:  Patient Vitals for the past 6 hrs:   BP Temp Temp src Resp SpO2   17 2230 101/60 - - - 98 %   17 2140 108/67 98.9  F (37.2  C) Oral - 96 %   17 1912 110/75 98.5  F (36.9  C) Oral 20 -     Cervix: /-2  Membranes: PROM @ 0200    FHR: Baseline 140-150, variability moderate, rare accels, no decels  Sargent: contractions q2-3min    A/P:  Jean-Paul Jimenez is a 27 year old  at 40w3d here for PROM, TOLAC, now desires a CS due to minimal cervical change despite being on pitocin for >12hrs.    - Long discussion regarding the risks and benefits of continued labor vs  section. Continuing to labor risks: infection for both Mom and baby with prolonged rupture of membranes, uterine rupture with TOLAC on pitocin and sudden changes in fetal heart rate with possible anoxic brain injury and necessitation of emergent CS with associated higher surgical risks, if need to go for a CS later it is higher risk of surgical complications with longer labor or need for more urgent manner. Risk of CS include surgical risks, I.e. Bleeding, infection, and injury to surrounding organs. Jean-Paul would like to proceed with a CS. Entire discussion held with an in person .  Discussed risks of surgery including bleeding requiring uterotonics, transfusion or additional procedures; injury to bowel, bladder, blood vessels, nerves, uterus, fallopian tubes, ovaries, baby; postoperative complications including infection, pain and DVT/PE. Consent signed. All questions answered.   - To OR for RLTCS  - repeat CBC given plt 135 and h/o HELLP syndrome  - 2g ancef and 500mg azithromycin prior to skin incision  - vaginal prep once in OR    #FWB:  - Cat I, intermittently reactive  - Continuous fetal monitoring  - GBS neg, EFW 7.5#, vtx by BSUS, placenta anterior    Sanaz Stallworth MD  OB-GYN PGY-2  17 1:26 AM

## 2017-11-13 NOTE — ANESTHESIA POST-OP FOLLOW-UP NOTE
"Post  Anesthesia Follow Up Note    Patient: Jean-Paul Jimenez    Patient location: Postpartum floor.    Chief complaint: Acute postoperative pain managment    Procedure(s) Performed:   SECTION    Anesthesia type: Spinal Block  without transversus abdominus plane (TAP) nerve block        Subjective:   The patient reports adequate pain control at times. Was sleeping comfortably at 0900 and 1415 when provider went to assess.  Pain Intensity: 6/10.  She reports mild pruritus. The patient denies weakness, paresthesia, numbness or tingling lips, tinnitus, metallic taste, difficulties breathing or voiding, nausea or vomiting. She is able to ambulate and tolerates regular diet. Voiced concern with not having medication if receiving TAP block.         Objective:  Respiratory Function (RR / SpO2 / Airway Patency): Satisfactory    Cardiac Function (HR / Rhythm / BP): Satisfactory    Strength and sensation lower extremities: Strength 5/5 and grossly symmetric bilateral LE    Site of spinal/epidural insertion: clean and intact, no tenderness, swelling or erythema    Last Vitals: BP 97/66  Temp 37.1  C (98.7  F) (Oral)  Resp 16  Ht 1.626 m (5' 4\")  LMP 2017  SpO2 97%  Breastfeeding? Unknown      Assessment and Plan:   Jean-Paul Jimenez is a 27 year old female POD #0 s/p   SECTION with IT  morphine  Pt is not ambulating, no weakness or paresthesias.  No evidence of adverse side effects associated with spinal. Pt is receiving adequate incisional pain control.  Anticipate up to 72 hours of incisional pain control.  Anticipate patient will require opioid/nonopioid analgesics for visceral and muscle pain not controlled with local anesthetic.      - Discussed at length with patient the benefits and risks of the TAP block procedure and even with describing how the block will control the pain while reducing the medications which make her sleepy, Jean-Paul remained undecided for TAP block. Jean-Paul and her family " agreed to have her checked on again the next morning.   - patient received verbal and written instructions about liposome bupivacaine   - please call if questions or concerns  - discussed plan with attending anesthesiologist    Felipe Coronel MD  [facility]  11/13/2017 2:58 PM    If questions or concerns, please contact OB Anesthesiologist  Phone [phone]

## 2017-11-13 NOTE — PLAN OF CARE
Patient arrived to [facility] via cart with infant in her arms.  Upon initial assessment pt. Stable, bands verified.  No further concerns at this time.

## 2017-11-14 ENCOUNTER — OFFICE VISIT (OUTPATIENT)
Dept: INTERPRETER SERVICES | Facility: CLINIC | Age: 27
End: 2017-11-14

## 2017-11-14 LAB
HGB BLD-MCNC: 9.7 G/DL (ref 11.7–15.7)
HGB F BLD QL KLEIH BETKE: NORMAL

## 2017-11-14 PROCEDURE — 85018 HEMOGLOBIN: CPT | Performed by: OBSTETRICS & GYNECOLOGY

## 2017-11-14 PROCEDURE — 85461 HEMOGLOBIN FETAL: CPT | Performed by: OBSTETRICS & GYNECOLOGY

## 2017-11-14 PROCEDURE — 85460 HEMOGLOBIN FETAL: CPT | Performed by: OBSTETRICS & GYNECOLOGY

## 2017-11-14 PROCEDURE — 36415 COLL VENOUS BLD VENIPUNCTURE: CPT | Performed by: OBSTETRICS & GYNECOLOGY

## 2017-11-14 PROCEDURE — T1013 SIGN LANG/ORAL INTERPRETER: HCPCS | Mod: U3

## 2017-11-14 PROCEDURE — 86900 BLOOD TYPING SEROLOGIC ABO: CPT | Performed by: OBSTETRICS & GYNECOLOGY

## 2017-11-14 PROCEDURE — 25000128 H RX IP 250 OP 636: Performed by: OBSTETRICS & GYNECOLOGY

## 2017-11-14 PROCEDURE — 12000028 ZZH R&B OB UMMC

## 2017-11-14 PROCEDURE — 86901 BLOOD TYPING SEROLOGIC RH(D): CPT | Performed by: OBSTETRICS & GYNECOLOGY

## 2017-11-14 PROCEDURE — 25000132 ZZH RX MED GY IP 250 OP 250 PS 637: Performed by: OBSTETRICS & GYNECOLOGY

## 2017-11-14 RX ORDER — OXYCODONE AND ACETAMINOPHEN 5; 325 MG/1; MG/1
1 TABLET ORAL EVERY 4 HOURS PRN
Qty: 15 TABLET | Refills: 0 | Status: SHIPPED | OUTPATIENT
Start: 2017-11-14 | End: 2017-11-15

## 2017-11-14 RX ORDER — IBUPROFEN 600 MG/1
600 TABLET, FILM COATED ORAL EVERY 6 HOURS PRN
Qty: 30 TABLET | Refills: 1 | Status: SHIPPED | OUTPATIENT
Start: 2017-11-14 | End: 2017-11-15

## 2017-11-14 RX ORDER — AMOXICILLIN 250 MG
1-2 CAPSULE ORAL 2 TIMES DAILY
Qty: 40 TABLET | Refills: 0 | Status: SHIPPED | OUTPATIENT
Start: 2017-11-14

## 2017-11-14 RX ORDER — FERROUS SULFATE 325(65) MG
325 TABLET ORAL
Qty: 60 TABLET | Refills: 0 | Status: SHIPPED | OUTPATIENT
Start: 2017-11-14

## 2017-11-14 RX ADMIN — SENNOSIDES AND DOCUSATE SODIUM 2 TABLET: 8.6; 5 TABLET ORAL at 09:21

## 2017-11-14 RX ADMIN — ACETAMINOPHEN 975 MG: 325 TABLET, FILM COATED ORAL at 11:52

## 2017-11-14 RX ADMIN — IBUPROFEN 800 MG: 400 TABLET ORAL at 09:21

## 2017-11-14 RX ADMIN — ACETAMINOPHEN 975 MG: 325 TABLET, FILM COATED ORAL at 02:28

## 2017-11-14 RX ADMIN — ACETAMINOPHEN 975 MG: 325 TABLET, FILM COATED ORAL at 20:09

## 2017-11-14 RX ADMIN — OXYCODONE HYDROCHLORIDE 10 MG: 5 TABLET ORAL at 13:28

## 2017-11-14 RX ADMIN — OXYCODONE HYDROCHLORIDE 10 MG: 5 TABLET ORAL at 02:28

## 2017-11-14 RX ADMIN — HUMAN RHO(D) IMMUNE GLOBULIN 300 MCG: 300 INJECTION, SOLUTION INTRAMUSCULAR at 15:12

## 2017-11-14 RX ADMIN — IBUPROFEN 800 MG: 400 TABLET ORAL at 17:05

## 2017-11-14 RX ADMIN — OXYCODONE HYDROCHLORIDE 10 MG: 5 TABLET ORAL at 17:05

## 2017-11-14 RX ADMIN — OXYCODONE HYDROCHLORIDE 10 MG: 5 TABLET ORAL at 23:12

## 2017-11-14 RX ADMIN — SENNOSIDES AND DOCUSATE SODIUM 2 TABLET: 8.6; 5 TABLET ORAL at 20:09

## 2017-11-14 RX ADMIN — OXYCODONE HYDROCHLORIDE 10 MG: 5 TABLET ORAL at 09:23

## 2017-11-14 RX ADMIN — OXYCODONE HYDROCHLORIDE 10 MG: 5 TABLET ORAL at 20:09

## 2017-11-14 RX ADMIN — IBUPROFEN 800 MG: 400 TABLET ORAL at 23:12

## 2017-11-14 NOTE — PROGRESS NOTES
[facility]  Postpartum Note    Name:  Jean-Paul Jimenez  MRN: [id number]    S: Feeling well.  Pain well controlled.  Tolerating regular diet without n/v.  Ambulating without dizziness.  Voiding spontaneously, passing flatus, no bowel movement. Lochia similar to menstrual flow.  Breast feeding, milk not in yet.  Undecided re contraception.  Plans discharge maybe tomorrow.    iPad [employer] - Onvelop  used for communication.    O:   Patient Vitals for the past 24 hrs:   BP Temp Temp src Pulse Heart Rate Resp SpO2   17 0232 104/75 98.7  F (37.1  C) Oral 94 - 16 98 %   17 2119 108/71 98.7  F (37.1  C) Oral 100 - 16 100 %   17 1515 104/70 98.4  F (36.9  C) Oral - 93 18 96 %   17 1143 97/66 98.7  F (37.1  C) Oral - 89 16 97 %   17 1038 97/75 98.7  F (37.1  C) Oral - 87 16 96 %   17 0940 100/63 98.3  F (36.8  C) Oral - 92 16 97 %   17 0840 99/69 99.7  F (37.6  C) Oral - 88 16 97 %   17 0730 116/73 99  F (37.2  C) Oral - 85 16 98 %   17 0645 106/66 98.3  F (36.8  C) - - 83 16 -     Gen:  Resting comfortably, NAD  CV:  Regular rate and rhythm   Pulm:  Non-labored breathing. No cough or wheezing.   Abd:  Soft, appropriately tender to palpation, non-distended.  Fundus at 1 below the umbilicus, firm and non-tender.  Incision: c/d/i with overlying bandage, no shadowing - will remove in shower today  Ext:  Non-tender, no LE edema b/l      Intake/Output Summary (Last 24 hours) at 17 0644  Last data filed at 17 0233   Gross per 24 hour   Intake          2565.84 ml   Output             3650 ml   Net         -1084.16 ml      UOP >100cc/hr    Hgb:   Recent Labs   Lab Test  17   0124   HGB  12.0       Assessment/Plan:  27 year old  on POD #1 s/p RLTCS for failed TOLAC following PROM.  Continue with routine postpartum management.     Pain: Well-controlled with sched tylenol, ibuprofen, PRN oxy  Hgb: 12.0>> AM pending. VSS as noted above,  asymptomatic.   GI:  BID Senna/Colace.  PRN Simethicone.   PPx:  Encouraged ambulation   Rh: Weak positive, treated as Rh neg, baby Rh positive. Rhogam ordered  Rubella: Immune  Feed: Breastfeeding  BC: Undecided --started discussing options but iPad  lost connection. Will discuss again tmr  Dispo: Plan for home on POD#2-3.     Sanaz Stallworth MD   OB/Gyn Resident, PGY-2    Physician Attestation   I, Brianna Ray, personally examined and evaluated this patient.  I discussed the patient with the resident and care team, and agree with the assessment and plan of care as documented in the resident s note of 11/14/17  [date].      I personally reviewed vital signs, medications, labs and exam.    Key findings: Doing well. TAPs block possibly today. Discussed ambulation, shower, remove bandage with assistance today. Discharge tomorrow if meeting goals, otherwise 11/16.  Brianna Ray  Date of Service (when I saw the patient): 11/14/17

## 2017-11-14 NOTE — PROGRESS NOTES
Paged by nursing regarding following up Jean-Paul for TAP block. On previous day had offered TAP block but Jean-Paul was hesitant and wanted to attempt to have pain controlled overnight.   In lengthy conversation with  present Jean-Paul expressed several reservations she had for receiving the tap block and ultimately decided not to receive the block.   Anesthesia will sign off.    Felipe Coronel MD CA2  November 14, 2017 12:35 PM

## 2017-11-15 ENCOUNTER — OFFICE VISIT (OUTPATIENT)
Dept: INTERPRETER SERVICES | Facility: CLINIC | Age: 27
End: 2017-11-15

## 2017-11-15 LAB
ABO + RH BLD: NORMAL
ABO + RH BLD: NORMAL
BLOOD BANK CMNT PATIENT-IMP: NORMAL
DATE RH IMM GL GVN: NORMAL
FETAL CELL SCN BLD QL ROSETTE: NORMAL
HGB BLD-MCNC: 9.5 G/DL (ref 11.7–15.7)
RH IG VIALS RECOM PATIENT: NORMAL

## 2017-11-15 PROCEDURE — 36415 COLL VENOUS BLD VENIPUNCTURE: CPT | Performed by: STUDENT IN AN ORGANIZED HEALTH CARE EDUCATION/TRAINING PROGRAM

## 2017-11-15 PROCEDURE — 25000132 ZZH RX MED GY IP 250 OP 250 PS 637: Performed by: OBSTETRICS & GYNECOLOGY

## 2017-11-15 PROCEDURE — 12000028 ZZH R&B OB UMMC

## 2017-11-15 PROCEDURE — T1013 SIGN LANG/ORAL INTERPRETER: HCPCS | Mod: U3

## 2017-11-15 PROCEDURE — 85018 HEMOGLOBIN: CPT | Performed by: STUDENT IN AN ORGANIZED HEALTH CARE EDUCATION/TRAINING PROGRAM

## 2017-11-15 RX ORDER — OXYCODONE HYDROCHLORIDE 5 MG/1
5-10 TABLET ORAL EVERY 4 HOURS PRN
Qty: 20 TABLET | Refills: 0 | Status: SHIPPED | OUTPATIENT
Start: 2017-11-15

## 2017-11-15 RX ORDER — ACETAMINOPHEN 325 MG/1
650 TABLET ORAL EVERY 4 HOURS PRN
Qty: 100 TABLET | Refills: 0 | Status: SHIPPED | OUTPATIENT
Start: 2017-11-16

## 2017-11-15 RX ORDER — IBUPROFEN 600 MG/1
600 TABLET, FILM COATED ORAL EVERY 6 HOURS PRN
Qty: 100 TABLET | Refills: 1 | Status: SHIPPED | OUTPATIENT
Start: 2017-11-15

## 2017-11-15 RX ADMIN — SENNOSIDES AND DOCUSATE SODIUM 2 TABLET: 8.6; 5 TABLET ORAL at 08:51

## 2017-11-15 RX ADMIN — IBUPROFEN 800 MG: 400 TABLET ORAL at 06:20

## 2017-11-15 RX ADMIN — OXYCODONE HYDROCHLORIDE 10 MG: 5 TABLET ORAL at 06:20

## 2017-11-15 RX ADMIN — ACETAMINOPHEN 975 MG: 325 TABLET, FILM COATED ORAL at 06:20

## 2017-11-15 RX ADMIN — OXYCODONE HYDROCHLORIDE 10 MG: 5 TABLET ORAL at 12:36

## 2017-11-15 RX ADMIN — ACETAMINOPHEN 975 MG: 325 TABLET, FILM COATED ORAL at 19:01

## 2017-11-15 RX ADMIN — IBUPROFEN 800 MG: 400 TABLET ORAL at 12:36

## 2017-11-15 NOTE — PLAN OF CARE
Problem: Patient Care Overview  Goal: Plan of Care/Patient Progress Review  Outcome: Improving  Pt vitals & assessments are stable. Up in the room & showered. Pt breast feeding well & independent. Encouraged to walk in hallways.

## 2017-11-15 NOTE — PROGRESS NOTES
[facility], [place]  Postpartum Note    Name:  Jean-Paul Jimenez  MRN: [id number]    S: Feeling well.  Pain not well controlled--doesn't want TAP blocks and feels like she takes the PO meds but then the pain comes back. Tolerating regular diet without n/v.  Ambulating but feeling dizzy.  Voiding spontaneously, passing flatus, no bowel movement. Lochia similar to menstrual flow.  Breast feeding, going well.  Undecided re contraception.  Plans discharge tomorrow    Elected to have  translate this AM.    O:   Patient Vitals for the past 24 hrs:   BP Temp Temp src Pulse Heart Rate Resp SpO2   17 1520 101/64 98.7  F (37.1  C) Oral - 92 16 -   17 0943 110/75 98.3  F (36.8  C) - 70 - 16 -   17 0232 104/75 98.7  F (37.1  C) Oral 94 - 16 98 %     Gen:  Resting comfortably, NAD  CV:  Regular rate and rhythm   Pulm:  Non-labored breathing. No cough or wheezing.   Abd:  Soft, appropriately tender to palpation, non-distended.  Fundus at 1 below the umbilicus, firm and non-tender.  Incision: c/d/i with overlying steristrips  Ext:  Non-tender, no LE edema b/l    Labs   2017 01:24 2017 07:04   Hemoglobin 12.0 9.7 (L)       Assessment/Plan:  27 year old  on POD #2 s/p RLTCS for failed TOLAC following PROM.  Continue with routine postpartum management.     Pain: Not well controlled with sched tylenol, ibuprofen, PRN oxy. Used 60mg oxy yesterday, did go 7hrs overnight without a dose so maybe that clouded her response this AM. Declined TAP blocks multiple times despite explanation that this can help immensely. Will try to encourage taking oxyocodone q3hr and ibuprofen q6hr for better control.  Hgb: 12.0>> 9.7. VSS as noted above, feeling dizzy--recheck Hgb this AM, no concern for active bleeding at this time though. Can transfuse vs IV iron PRN  GI:  BID Senna/Colace.  PRN Simethicone.   PPx:  Encouraged ambulation   Rh: Weak positive, treated as Rh neg, baby Rh positive. S/p rhogam  11/14  Rubella: Immune  Feed: Breastfeeding  BC: Undecided --discussed options, considering nexplanon vs depo, will discuss more tmr  Dispo: Plan for home tomorrow, needs to work on pain control today     Sanaz Stallworth MD   OB/Gyn Resident, PGY-2    Attestation:   This patient was seen and evaluated by me, separately from the house staff team. I have reviewed the note/plan above and agree.     Doing well and will work on pain control.  Plan discharge home tomorrow.      Catrina Garrison MD

## 2017-11-15 NOTE — PLAN OF CARE
Problem: Patient Care Overview  Goal: Plan of Care/Patient Progress Review  Outcome: Improving  Vss, postpartum assessment WDL. Taking tylenol, ibuprofen, and oxycodone for pain control. Voiding and emptying. Took a shower today and incision dressing removed. Encouraged ambulation. Rhogam given. Breast feeding independently. Continue routine care.

## 2017-11-15 NOTE — PLAN OF CARE
VSS. Postpartum checks WDL. Incision NUVIA with steri-strips- no s/s of infection noted. Up independently, voiding without difficulty. Passing gas, no BM since surgery but denies discomfort. Pain managed with ibuprofen, tylenol, and oxycodone. Breastfeeding well with minimal assist for deep latch. Able to hand-express colostrum easily. Will continue to monitor.

## 2017-11-16 VITALS
SYSTOLIC BLOOD PRESSURE: 109 MMHG | TEMPERATURE: 97.5 F | DIASTOLIC BLOOD PRESSURE: 72 MMHG | HEIGHT: 64 IN | OXYGEN SATURATION: 98 % | HEART RATE: 70 BPM | RESPIRATION RATE: 18 BRPM

## 2017-11-16 PROCEDURE — T1013 SIGN LANG/ORAL INTERPRETER: HCPCS | Mod: U3

## 2017-11-16 PROCEDURE — 25000132 ZZH RX MED GY IP 250 OP 250 PS 637: Performed by: OBSTETRICS & GYNECOLOGY

## 2017-11-16 RX ADMIN — ACETAMINOPHEN 975 MG: 325 TABLET, FILM COATED ORAL at 11:06

## 2017-11-16 RX ADMIN — SENNOSIDES AND DOCUSATE SODIUM 2 TABLET: 8.6; 5 TABLET ORAL at 07:55

## 2017-11-16 RX ADMIN — OXYCODONE HYDROCHLORIDE 10 MG: 5 TABLET ORAL at 03:10

## 2017-11-16 RX ADMIN — OXYCODONE HYDROCHLORIDE 10 MG: 5 TABLET ORAL at 00:26

## 2017-11-16 RX ADMIN — IBUPROFEN 800 MG: 400 TABLET ORAL at 06:28

## 2017-11-16 RX ADMIN — OXYCODONE HYDROCHLORIDE 10 MG: 5 TABLET ORAL at 06:28

## 2017-11-16 RX ADMIN — OXYCODONE HYDROCHLORIDE 10 MG: 5 TABLET ORAL at 09:27

## 2017-11-16 RX ADMIN — ACETAMINOPHEN 975 MG: 325 TABLET, FILM COATED ORAL at 03:10

## 2017-11-16 RX ADMIN — IBUPROFEN 800 MG: 400 TABLET ORAL at 00:26

## 2017-11-16 NOTE — PLAN OF CARE
VSS and assessments WDL. Pain well managed according to patient, declined pain oral pain meds several times during shift, refer to MAR. Breastfeeding infant independently, latch checked. Incision healing well, no signs of infection. Up ad tommie, voiding freely. Family by bedside, supportive. No concerns at this time, continue with current POC. Anticipated discharged tomorrow.

## 2017-11-16 NOTE — PLAN OF CARE
Problem: Patient Care Overview  Goal: Plan of Care/Patient Progress Review  Outcome: Adequate for Discharge Date Met: 11/16/17  Pt discharged to home with baby. D/c instructions & prescriptions given & reviewed. ID bands double checked. Pt had her questions answered through [facility] . Instructed to F/U at clinic within 6 weeks for PP check.

## 2017-11-16 NOTE — PROGRESS NOTES
[facility]  Postpartum Note    Name:  Jean-Paul Jimenez  MRN: [id number]    S: Feeling well.  Pain well controlled now, a little burning with urination though. Tolerating regular diet without n/v.  Ambulating but feeling unsteady on feet due to pain, described as dizzy initially but clarified she does not feel like she is going to pass out.  Voiding spontaneously, passing flatus, had a bowel movement. Lochia similar to menstrual flow.  Breast feeding, going well.  Undecided re contraception, many questions regarding contraception answered.    IPad  used.    O:   Patient Vitals for the past 24 hrs:   BP Temp Temp src Heart Rate Resp   17 0033 104/69 97.4  F (36.3  C) Oral 82 16   11/15/17 1747 104/78 98.3  F (36.8  C) Oral 86 16   11/15/17 0753 94/63 98  F (36.7  C) Oral 93 16     Gen:  Resting comfortably, NAD  CV:  Regular rate and rhythm   Pulm:  Non-labored breathing. No cough or wheezing.   Abd:  Soft, appropriately tender to palpation, non-distended.  Fundus at 1 below the umbilicus, firm and non-tender.  Incision: c/d/i with overlying steristrips  Ext:  Non-tender, no LE edema b/l    Labs   2017 01:24 2017 07:04 11/15/2017 09:29   Hemoglobin 12.0 9.7 (L) 9.5 (L)       Assessment/Plan:  27 year old  on POD #3 s/p RLTCS for failed TOLAC following PROM.  Continue with routine postpartum management.     Pain: Well controlled with sched tylenol, ibuprofen, PRN oxy  Hgb: 12.0>> 9.7>9.5. VSS as noted above, feeling dizzy when seen yesterday AM, clarified not dizzy today just unsteady d/t pain w/ walking  GI:  BID Senna/Colace.  PRN Simethicone.   PPx:  Encouraged ambulation   Rh: Weak positive, treated as Rh neg, baby Rh positive. S/p rhogam   Rubella: Immune  Feed: Breastfeeding  BC: Undecided --discussed options, considering nexplanon it seems  Dispo: Plan for home today    Sanaz Stallworth MD   OB/Gyn Resident, PGY-2

## 2017-11-16 NOTE — PLAN OF CARE
VSS. Postpartum checks WDL. Incision NUVIA with steri-strips- no s/s of infection noted. Abdominal binder on for support. Pain managed with ibuprofen, tylenol, and oxycodone. Up independently, voiding without difficulty. Breastfeeding well with good latch observed.  at bedside. Will continue POC.

## 2018-01-04 ENCOUNTER — PRENATAL OFFICE VISIT (OUTPATIENT)
Dept: OBGYN | Facility: CLINIC | Age: 28
End: 2018-01-04
Payer: COMMERCIAL

## 2018-01-04 VITALS
BODY MASS INDEX: 21.61 KG/M2 | SYSTOLIC BLOOD PRESSURE: 101 MMHG | DIASTOLIC BLOOD PRESSURE: 69 MMHG | WEIGHT: 125.9 LBS | HEART RATE: 72 BPM

## 2018-01-04 PROBLEM — Z23 NEED FOR TDAP VACCINATION: Status: RESOLVED | Noted: 2017-08-30 | Resolved: 2018-01-04

## 2018-01-04 PROBLEM — Z34.90 PREGNANCY: Status: RESOLVED | Noted: 2017-11-12 | Resolved: 2018-01-04

## 2018-01-04 PROBLEM — Z98.891 S/P CESAREAN SECTION: Status: RESOLVED | Noted: 2017-11-13 | Resolved: 2018-01-04

## 2018-01-04 PROBLEM — Z67.91 RH NEGATIVE STATUS DURING PREGNANCY IN FIRST TRIMESTER, ANTEPARTUM: Status: RESOLVED | Noted: 2017-05-11 | Resolved: 2018-01-04

## 2018-01-04 PROBLEM — O34.219 PREVIOUS CESAREAN DELIVERY, ANTEPARTUM CONDITION OR COMPLICATION: Status: RESOLVED | Noted: 2017-05-11 | Resolved: 2018-01-04

## 2018-01-04 PROBLEM — Z36.89 ENCOUNTER FOR TRIAGE IN PREGNANT PATIENT: Status: RESOLVED | Noted: 2017-11-12 | Resolved: 2018-01-04

## 2018-01-04 PROBLEM — O26.891 RH NEGATIVE STATUS DURING PREGNANCY IN FIRST TRIMESTER, ANTEPARTUM: Status: RESOLVED | Noted: 2017-05-11 | Resolved: 2018-01-04

## 2018-01-04 PROBLEM — Z98.891 PREVIOUS CESAREAN SECTION: Status: RESOLVED | Noted: 2017-05-11 | Resolved: 2018-01-04

## 2018-01-04 PROCEDURE — 99207 ZZC POST PARTUM EXAM: CPT | Performed by: OBSTETRICS & GYNECOLOGY

## 2018-01-04 ASSESSMENT — PATIENT HEALTH QUESTIONNAIRE - PHQ9: SUM OF ALL RESPONSES TO PHQ QUESTIONS 1-9: 2

## 2018-01-04 NOTE — MR AVS SNAPSHOT
"              After Visit Summary   1/4/2018    Jean-Paul Jimenez    MRN: [id number]           Patient Information     Date Of Birth          1990        Visit Information        Provider Department      1/4/2018 10:00 AM Divya Ruiz MD; LANGUAGE [facility]        Today's Diagnoses     Routine postpartum follow-up    -  1       Follow-ups after your visit        Your next 10 appointments already scheduled     Saurabh 10, 2018 11:30 AM CST   Post Partum with Divya Ruiz MD   [facility] ([facility])    [address] [place] [id number] [phone]              Who to contact     If you have questions or need follow up information about today's clinic visit or your schedule please contact [facility] directly at [phone].  Normal or non-critical lab and imaging results will be communicated to you by MyChart, letter or phone within 4 business days after the clinic has received the results. If you do not hear from us within 7 days, please contact the clinic through MyChart or phone. If you have a critical or abnormal lab result, we will notify you by phone as soon as possible.  Submit refill requests through bluebottlebiz or call your pharmacy and they will forward the refill request to us. Please allow 3 business days for your refill to be completed.          Additional Information About Your Visit        MyChart Information     [email or web address] lets you send messages to your doctor, view your test results, renew your prescriptions, schedule appointments and more. To sign up, go to [email or web address] . Click on \"Log in\" on the left side of the screen, which will take you to the Welcome page. Then click on \"Sign up Now\" on the right side of the page.     You will be asked to enter the access code listed below, as well as some personal information. Please follow the directions to create your username and password.     Your " access code is: [email or web address]  Expires: 2018 12:28 PM     Your access code will  in 90 days. If you need help or a new code, please call your [place] clinic or [phone].        Care EveryWhere ID     This is your Care EveryWhere ID. This could be used by other organizations to access your [place] medical records  [id number]        Your Vitals Were     Pulse Breastfeeding? BMI (Body Mass Index)             72 Yes 21.61 kg/m2          Blood Pressure from Last 3 Encounters:   18 101/69   17 109/72   17 108/67    Weight from Last 3 Encounters:   18 125 lb 14.4 oz (57.1 kg)   17 142 lb 14.4 oz (64.8 kg)   10/30/17 143 lb (64.9 kg)              Today, you had the following     No orders found for display       Primary Care Provider Fax #    Physician No Ref-Primary [phone]       No address on file        Equal Access to Services     KENDALL HOYOS : Hadii benny ku hadasho Soomaali, waaxda luqadaha, qaybta kaalmada adeegyada, waxay yulia barrientos . So [facility] [phone].    ATENCIÓN: Si habla español, tiene a stahl disposición servicios gratuitos de asistencia lingüística. Llame al [phone].    We comply with applicable federal civil rights laws and [place] laws. We do not discriminate on the basis of race, color, national origin, age, disability, sex, sexual orientation, or gender identity.            Thank you!     Thank you for choosing [facility]  for your care. Our goal is always to provide you with excellent care. Hearing back from our patients is one way we can continue to improve our services. Please take a few minutes to complete the written survey that you may receive in the mail after your visit with us. Thank you!             Your Updated Medication List - Protect others around you: Learn how to safely use, store and throw away your medicines at www.disposemymeds.org.          This list is accurate as of: 18 12:28 PM.   Always use your most recent med list.                   Brand Name Dispense Instructions for use Diagnosis    acetaminophen 325 MG tablet    TYLENOL    100 tablet    Take 2 tablets (650 mg) by mouth every 4 hours as needed for other (surgical pain)    S/P  section       ferrous sulfate 325 (65 FE) MG tablet    IRON    60 tablet    Take 1 tablet (325 mg) by mouth daily (with breakfast)    Anemia due to blood loss, acute       GNP PRENATAL VITAMINS 28-0.8 MG Tabs     100 tablet    Take 1 tablet by mouth daily    Supervision of other normal pregnancy, antepartum       ibuprofen 600 MG tablet    ADVIL/MOTRIN    100 tablet    Take 1 tablet (600 mg) by mouth every 6 hours as needed for moderate pain    S/P  section       oxyCODONE IR 5 MG tablet    ROXICODONE    20 tablet    Take 1-2 tablets (5-10 mg) by mouth every 4 hours as needed for moderate to severe pain    S/P  section       senna-docusate 8.6-50 MG per tablet    SENOKOT-S;PERICOLACE    40 tablet    Take 1-2 tablets by mouth 2 times daily    S/P  section

## 2018-01-04 NOTE — NURSING NOTE
"Chief Complaint   Patient presents with     Post Partum Exam     post ob       Initial /69  Pulse 72  Wt 125 lb 14.4 oz (57.1 kg)  Breastfeeding? Yes  BMI 21.61 kg/m2 Estimated body mass index is 21.61 kg/(m^2) as calculated from the following:    Height as of 17: 5' 4\" (1.626 m).    Weight as of this encounter: 125 lb 14.4 oz (57.1 kg).  BP completed using cuff size: regular        The following HM Due: NONE      The following patient reported/Care Every where data was sent to:  P ABSTRACT QUALITY INITIATIVES [[id number]]        patient has appointment for today  Jacqueline Farley                "

## 2018-01-04 NOTE — PROGRESS NOTES
Jean-Paul is here for a 6-week postpartum checkup.    She had a repeat c/s of a viable girl, weight 7 pounds 8 oz., with no complications after failed TOLAC.  Since delivery, she has been breast feeding.  She has no signs of infection, bleeding or other complications.  She is not pregnant.  We discussed contraceptions and she is unsure, would like to hear options.  She denies feeling sad, depressed or too overwhelmed.    PHQ-9 SCORE 1/4/2018   Total Score 2         EXAM:    HEENT: grossly normal.  NECK: no lymphadenopathy or thyroidomegaly.  LUNGS: CTA X 2, no rales or crackles.  BACK: No spinal or CVA tenderness.  HEART: RRR without murmurs clicks or gallops.  ABDOMEN: soft, non tender, good bowel sounds, without masses rebound, guarding or tenderness. Incision well healed    PELVIC:    Deferred    EXTREMITIES: Warm to touch, good pulses, no ankle edema or calf tenderness.  NEUROLOGIC: grossly normal.    ASSESSMENT:   Normal 6-week postpartum exam after repeat c/s.    PLAN:  Pap smear utd.  OK to resume normal activities.We discussed contraceptive options including POPs, depo, nexplanon and IUD.  She is leaning toward nexplanon, will decide and RTC for placement if desired.    BERNY FRANCO MD

## 2018-01-10 ENCOUNTER — OFFICE VISIT (OUTPATIENT)
Dept: OBGYN | Facility: CLINIC | Age: 28
End: 2018-01-10
Payer: COMMERCIAL

## 2018-01-10 VITALS
BODY MASS INDEX: 21.54 KG/M2 | SYSTOLIC BLOOD PRESSURE: 103 MMHG | HEART RATE: 65 BPM | DIASTOLIC BLOOD PRESSURE: 63 MMHG | WEIGHT: 125.5 LBS

## 2018-01-10 DIAGNOSIS — Z30.017 NEXPLANON INSERTION: Primary | ICD-10-CM

## 2018-01-10 LAB — BETA HCG QUAL IFA URINE: NEGATIVE

## 2018-01-10 PROCEDURE — T1013 SIGN LANG/ORAL INTERPRETER: HCPCS | Mod: U3 | Performed by: OBSTETRICS & GYNECOLOGY

## 2018-01-10 PROCEDURE — 84703 CHORIONIC GONADOTROPIN ASSAY: CPT | Performed by: OBSTETRICS & GYNECOLOGY

## 2018-01-10 PROCEDURE — 11981 INSERTION DRUG DLVR IMPLANT: CPT | Performed by: OBSTETRICS & GYNECOLOGY

## 2018-01-10 ASSESSMENT — PATIENT HEALTH QUESTIONNAIRE - PHQ9: SUM OF ALL RESPONSES TO PHQ QUESTIONS 1-9: 0

## 2018-01-10 NOTE — PROGRESS NOTES
NEXPLANON INSERTION PROCEDURE    Jean-Paul Jimenez is a 27 year old  who presents for Nexplanon insertion. Indication for nexplanon insertion is contraception. No LMP recorded.. The patient is currently using abstinence  for contraception since she is postpartum.     Tests:  Upt: negative    A complete discussion of the risks and benefits of Nexplanon use and the details of the insertion procedure was held with the patient. The anticipated bleeding profile was specifically discussed and patient voiced understanding. All questions were answered. A consent form was signed.      Prior to the beginning of the procedure the team paused to verify the patient's identity, as well as the procedure to be performed and the correct side/site. All equipment required was ready and available. The patient was positioned appropriately.     Preprocedure medications: 1% plain lidocaine, 3 ml  Nexplanon Lot # [id number]    Patient's allergies were confirmed. The patient was placed in the supine position with her left (non-dominant) arm flexed at the elbow, externally rotated, and placed with her wrist parallel to her ear. The insertion site was identified 8-10 cm above the medial epicondyle of the humerus at the inner aspect of the arm. The insertion site was marked with a sterile marker. The direction of insertion was also indicated with a elayne a few centimeters proximal. The insertion area was cleaned with betadine swabs and anesthetized with 2 ml of 1% lidocaine without epinephrine along the planned insertion tunnel.  The Nexplanon applicator was removed from its blister. The needle shield was removed, maintaining the applicator upright. The white implant was visualized in the needle tip. Counter-traction was applied to the skin at the marked needle insertion site.  The tip of the needle was inserted at the site, beveled side up, at a 30-degree angle. The applicator was then lowered to a horizontal position. While lifting the skin  with the tip of the needle, the needle was inserted to its full length. The slider was unlocked and moved fully back to the stop.   The 4 cm leila was palpated under the skin. The patient also palpated the leila. A pressure bandage was applied with sterile gauze. The patient was instructed to remove the bangage in several hours and replace with a band-aid.    The user card was filled out and given to the patient to keep.  The Patient Chart Label was completed and sent for scanning.    PLAN:   The patient was asked to contact the clinic for any fever/chills/insertion site pain or heavy bleeding.     FOLLOW-UP:  She was asked to follow up for any problems.     BERNY FRANCO MD

## 2018-01-10 NOTE — MR AVS SNAPSHOT
"              After Visit Summary   1/10/2018    Jean-Paul Jimenez    MRN: [id number]           Patient Information     Date Of Birth          1990        Visit Information        Provider Department      1/10/2018 11:15 AM Tom Ospina Jennifer L, MD [facility]        Today's Diagnoses     Nexplanon insertion    -  1       Follow-ups after your visit        Who to contact     If you have questions or need follow up information about today's clinic visit or your schedule please contact [facility] directly at [phone].  Normal or non-critical lab and imaging results will be communicated to you by sevenloadhart, letter or phone within 4 business days after the clinic has received the results. If you do not hear from us within 7 days, please contact the clinic through sevenloadhart or phone. If you have a critical or abnormal lab result, we will notify you by phone as soon as possible.  Submit refill requests through [employer] or call your pharmacy and they will forward the refill request to us. Please allow 3 business days for your refill to be completed.          Additional Information About Your Visit        MyChart Information     [employer] lets you send messages to your doctor, view your test results, renew your prescriptions, schedule appointments and more. To sign up, go to [email or web address] . Click on \"Log in\" on the left side of the screen, which will take you to the Welcome page. Then click on \"Sign up Now\" on the right side of the page.     You will be asked to enter the access code listed below, as well as some personal information. Please follow the directions to create your username and password.     Your access code is: [email or web address]  Expires: 2018 12:28 PM     Your access code will  in 90 days. If you need help or a new code, please call your [facility]3] or [phone].        Care EveryWhere ID     This is your Care EveryWhere ID. This could be used by " other organizations to access your [place] medical records  [id number]        Your Vitals Were     Pulse Breastfeeding? BMI (Body Mass Index)             65 Yes 21.54 kg/m2          Blood Pressure from Last 3 Encounters:   01/10/18 103/63   01/04/18 101/69   11/16/17 109/72    Weight from Last 3 Encounters:   01/10/18 125 lb 8 oz (56.9 kg)   01/04/18 125 lb 14.4 oz (57.1 kg)   11/09/17 142 lb 14.4 oz (64.8 kg)              We Performed the Following     Beta HCG qual IFA urine     INSERTION NON-BIODEGRADABLE DRUG DELIVERY IMPLANT          Today's Medication Changes          These changes are accurate as of: 1/10/18 12:08 PM.  If you have any questions, ask your nurse or doctor.               Start taking these medicines.        Dose/Directions    etonogestrel 68 MG Impl   Commonly known as:  IMPLANON/NEXPLANON   Used for:  Nexplanon insertion        Dose:  1 each   1 each (68 mg) by Subdermal route once for 1 dose   Quantity:  1 each   Refills:  0            Where to get your medicines      Some of these will need a paper prescription and others can be bought over the counter.  Ask your nurse if you have questions.     You don't need a prescription for these medications     etonogestrel 68 MG Impl                Primary Care Provider Fax #    Physician No Ref-Primary [phone]       No address on file        Equal Access to Services     KENDALL HOYOS : Marci marks Sodarell, waaxda luqadaha, qaybta kaalmada adeegyada, nikita vail. So [facility] [phone].    ATENCIÓN: Si habla español, tiene a stahl disposición servicios gratuitos de asistencia lingüística. Llame al [phone].    We comply with applicable federal civil rights laws and [place] laws. We do not discriminate on the basis of race, color, national origin, age, disability, sex, sexual orientation, or gender identity.            Thank you!     Thank you for choosing [facility]  for your care. Our  goal is always to provide you with excellent care. Hearing back from our patients is one way we can continue to improve our services. Please take a few minutes to complete the written survey that you may receive in the mail after your visit with us. Thank you!             Your Updated Medication List - Protect others around you: Learn how to safely use, store and throw away your medicines at www.disposemymeds.org.          This list is accurate as of: 1/10/18 12:08 PM.  Always use your most recent med list.                   Brand Name Dispense Instructions for use Diagnosis    acetaminophen 325 MG tablet    TYLENOL    100 tablet    Take 2 tablets (650 mg) by mouth every 4 hours as needed for other (surgical pain)    S/P  section       etonogestrel 68 MG Impl    IMPLANON/NEXPLANON    1 each    1 each (68 mg) by Subdermal route once for 1 dose    Nexplanon insertion       ferrous sulfate 325 (65 FE) MG tablet    IRON    60 tablet    Take 1 tablet (325 mg) by mouth daily (with breakfast)    Anemia due to blood loss, acute       GNP PRENATAL VITAMINS 28-0.8 MG Tabs     100 tablet    Take 1 tablet by mouth daily    Supervision of other normal pregnancy, antepartum       ibuprofen 600 MG tablet    ADVIL/MOTRIN    100 tablet    Take 1 tablet (600 mg) by mouth every 6 hours as needed for moderate pain    S/P  section       oxyCODONE IR 5 MG tablet    ROXICODONE    20 tablet    Take 1-2 tablets (5-10 mg) by mouth every 4 hours as needed for moderate to severe pain    S/P  section       senna-docusate 8.6-50 MG per tablet    SENOKOT-S;PERICOLACE    40 tablet    Take 1-2 tablets by mouth 2 times daily    S/P  section

## 2018-09-20 NOTE — PROGRESS NOTES
40w0d feeling ok, no c/o.  Good fm.  Some pelvic pressure and LBP, but no ctx that she can tell.  Her  is with her today and via  he expressed his unhappiness with her previous delivery.  He reports that the doctor promised to be there and then was not.  I reviewed our call schedule and that if she is in labor it would be whichever MD is on call, vs a scheduled c/s would be with Dr. Mcdowell. They also strongly desire TOLAC, wondering why the c/s has to be scheduled.  I explained the normal recommendation for delivery by 42 wks, and that we typically plan IOL/delivery at 41 wks.  I explained that if she has reassuring  testing, it may be possible to delay c/s to 42 wks, but decision wiould be up to Dr. Mcdowell.  I did explain that there is a slightly higher incidence of c/s at 42wks due to CPD and placental insufficiency causing fetal intolerance to labor.  They already have an appt with Dr. mcdowell early next week, so will discuss further then.   Labor instructions and kick counts reviewed. ann   no

## 2022-05-04 NOTE — DISCHARGE SUMMARY
Restart  daily baby aspirin [facility] Discharge Summary    Jean-Paul Jimenez MRN# [id number]   Age: 27 year old YOB: 1990     Date of Admission:  2017  Date of Discharge:  2017  Admitting Physician:  Divya Ruiz MD  Discharge Physician:  Judy Hernandez MD    Admit Dx:   - Intrauterine pregnancy at 40w4d   - PROM   - History of  section x1  - History of HELLP syndrome in previous pregnancy   - Rh negative status   - Gestational thrombocytopenia     Discharge Dx:  - Same as above, s/p repeat low transverse  section  - Acute blood loss anemia due to procedure    Procedures:  - Repeat low transverse  section with double layer uterine closure via Pfannenstiel incision  - Spinal analgesia    Admit HPI:  Jean-Paul Jimenez is a 27 year old  at 40w4d admitted for PROM in pregnancy c/b prior CS for HELLP syndrome with single layer uterine closure. She was offered a  section on arrival but she strongly desired to TOLAC. Contractions did not increase and she failed to make cervical change and so pitocin was started and titrated upwards. After 21 hours of rupture and 14 hours of pitocin she had progressed from closed to 1cm dilation and was exhausted. She decided to proceed with  section at that time. The risks, benefits, and alternatives of  section were discussed with the patient with help from an in person , and she agreed to proceed.   Please see her admit H&P for full details of her PMH, PSH, Meds, Allergies and exam on admit.    Operative Course:  Surgery was uncomplicated. EBL from the delivery was 800mL. Please see her  Section Operative Note for full details regarding her delivery.    Operative Findings:  1. Moderate anterior abdominal wall adhesions out of proportion for a second CS and minimal intraabdominal adhesions.   2. Uterine window  3. Clear amniotic fluid  4. Liveborn female infant in KEYSHAWN presentation. Apgars 8 at  1 minute & 9 at 5 minutes. Weight 7lb 8oz.  5. Normal uterus, fallopian tubes, and ovaries.      Postoperative Course:  Her postoperative course was uncomplicated. On POD#3, she was meeting all of her postpartum goals and deemed stable for discharge. She was voiding without difficulty, tolerating a regular diet without nausea and vomiting, her pain was well controlled on oral pain medicines and her lochia was appropriate. Her hemoglobin prior to delivery was 12.0 and after delivery was 9.7. Her Rh status was negative, baby was Rh positive and Rhogam was given.    Discharge Medications:   Jean-Paul Jimenez   Home Medication Instructions RUFUS:[id number]    Printed on:11/15/17 9383   Medication Information                      acetaminophen (TYLENOL) 325 MG tablet  Take 2 tablets (650 mg) by mouth every 4 hours as needed for other (surgical pain)             aspirin 81 MG tablet  Take 81 mg by mouth daily             ferrous sulfate (IRON) 325 (65 FE) MG tablet  Take 1 tablet (325 mg) by mouth daily (with breakfast)             ibuprofen (ADVIL/MOTRIN) 600 MG tablet  Take 1 tablet (600 mg) by mouth every 6 hours as needed for moderate pain             oxyCODONE IR (ROXICODONE) 5 MG tablet  Take 1-2 tablets (5-10 mg) by mouth every 4 hours as needed for moderate to severe pain             Prenatal Vit-Fe Fumarate-FA (GNP PRENATAL VITAMINS) 28-0.8 MG TABS  Take 1 tablet by mouth daily             senna-docusate (SENOKOT-S;PERICOLACE) 8.6-50 MG per tablet  Take 1-2 tablets by mouth 2 times daily                     Discharge/Disposition:  Jean-Paul Jimenez was discharged to home in stable condition with the following instructions/medications:  1) Call for temperature > 100.4, bright red vaginal bleeding >1 pad an hour x 2 hours, foul smelling vaginal discharge, pain not controlled by usual oral pain meds, persistent nausea and vomiting not controlled on medications, drainage or redness from incision site  2) She was undecided  about contraception.  3) For feeding she decided to breastfeed.  4) She was instructed to follow-up with her primary OB in 6 weeks for a routine postpartum visit and CBC.  5) Discharge activity:  No heavy lifting >15 lbs or strenuous activity for 6 weeks, pelvic rest for 6 weeks, no driving or operating machinery while on narcotics.    Sanaz Stallworth MD   OB/GYN PGY-2

## 2024-01-09 NOTE — ANESTHESIA CARE TRANSFER NOTE
----- Message from America Zheng sent at 1/9/2024 11:14 AM EST -----  Subject: Message to Provider    QUESTIONS  Information for Provider? Pt dropped off [employer] papers last week and would   like to be called when they are ready to be picked up in office.   ---------------------------------------------------------------------------  --------------  CALL BACK INFO  [phone]; OK to leave message on voicemail  ---------------------------------------------------------------------------  --------------  SCRIPT ANSWERS  Relationship to Patient? Self   Patient: Jean-Paul Jimenez    Procedure(s):   - Wound Class: II-Clean Contaminated    Diagnosis: pregnancy  Diagnosis Additional Information: No value filed.    Anesthesia Type:   Spinal     Note:  Airway :Room Air  Patient transferred to:PACU  Comments: .Anesthesia Care Transfer Note    Patient: Jean-Paul Jimenez    Transferred to: PACU    Patient vital signs: stable    Airway: none    Monitors applied, VSS.  Patient awake and comfortable, breathing spontaneously.  Report given to RN with transfer of care.        Teresa Cabezas CRNA  11/13/2017  4:02 AM  Handoff Report: Identifed the Patient, Identified the Reponsible Provider, Reviewed the pertinent medical history, Discussed the surgical course, Reviewed Intra-OP anesthesia mangement and issues during anesthesia, Set expectations for post-procedure period and Allowed opportunity for questions and acknowledgement of understanding      Vitals: (Last set prior to Anesthesia Care Transfer)    CRNA VITALS  11/13/2017 0328 - 11/13/2017 0402      11/13/2017             Pulse: 84    SpO2: 98 %                Electronically Signed By: CECY Flowers CRNA  November 13, 2017  4:02 AM

## 2024-03-12 NOTE — NURSING NOTE
"Chief Complaint   Patient presents with     Consult     contraception     Procedure     Nexplanon NDC: 7750-3804-55, LOT: [id number], EXP: 2020       Initial /63  Pulse 65  Wt 125 lb 8 oz (56.9 kg)  Breastfeeding? Yes  BMI 21.54 kg/m2 Estimated body mass index is 21.54 kg/(m^2) as calculated from the following:    Height as of 17: 5' 4\" (1.626 m).    Weight as of this encounter: 125 lb 8 oz (56.9 kg).  BP completed using cuff size: regular        The following HM Due: NONE      The following patient reported/Care Every where data was sent to:  P ABSTRACT QUALITY INITIATIVES [[id number]]        patient has appointment for today  Jacqueline Farley                " 68

## 2024-08-14 NOTE — PROGRESS NOTES
Lower back pain, sounds like left sciatica as well.  Discussed, she may wish to try a maternity belt.  GBS negative.  She will schedule an office visit next week, and I will see her back early in the week of Nov 13.  RCS, if needed, is scheduled Nov 16.  AM  
Statement Selected

## (undated) DEVICE — PACK C-SECTION LF PL15OTA83B

## (undated) DEVICE — BARRIER SEPRAFILM 5X6" SINGLE SHEET 4301-02

## (undated) DEVICE — SOL NACL 0.9% IRRIG 1000ML BOTTLE 07138-09

## (undated) DEVICE — SUCTION CANISTER MEDIVAC LINER 1500ML W/LID 65651-515

## (undated) DEVICE — STOCKING SLEEVE COMPRESSION CALF LG

## (undated) DEVICE — STRAP KNEE/BODY 31143004

## (undated) DEVICE — SOL WATER IRRIG 1000ML BOTTLE 07139-09

## (undated) DEVICE — CATH TRAY FOLEY 16FR SILICONE 907416

## (undated) DEVICE — DRSG ADAPTIC 3X8" 6113

## (undated) DEVICE — ESU GROUND PAD UNIVERSAL W/O CORD

## (undated) DEVICE — PREP CHLORAPREP 26ML TINTED ORANGE  260815

## (undated) DEVICE — STPL SKIN 35W 059037

## (undated) DEVICE — DRSG ABDOMINAL 07 1/2X8" 7197D

## (undated) DEVICE — BASIN SET MAJOR

## (undated) DEVICE — GLOVE ESTEEM POWDER FREE SMT 7.0  2D72PT70

## (undated) DEVICE — BNDG ABDOMINAL BINDER 10X26-50" 08140145

## (undated) DEVICE — SU VICRYL 0 CT-1 36" J346H

## (undated) DEVICE — GLOVE SENSICARE PI POWDER FREE 7.5 LATEX FREE MSG9075

## (undated) DEVICE — SU MONOCRYL 0 CT-1 36" Y346H

## (undated) RX ORDER — FENTANYL CITRATE 50 UG/ML
INJECTION, SOLUTION INTRAMUSCULAR; INTRAVENOUS
Status: DISPENSED
Start: 2017-11-13

## (undated) RX ORDER — OXYTOCIN/0.9 % SODIUM CHLORIDE 30/500 ML
PLASTIC BAG, INJECTION (ML) INTRAVENOUS
Status: DISPENSED
Start: 2017-11-13

## (undated) RX ORDER — MORPHINE SULFATE 1 MG/ML
INJECTION, SOLUTION EPIDURAL; INTRATHECAL; INTRAVENOUS
Status: DISPENSED
Start: 2017-11-13